# Patient Record
Sex: MALE | Race: WHITE | NOT HISPANIC OR LATINO
[De-identification: names, ages, dates, MRNs, and addresses within clinical notes are randomized per-mention and may not be internally consistent; named-entity substitution may affect disease eponyms.]

---

## 2017-01-26 ENCOUNTER — RESULT REVIEW (OUTPATIENT)
Age: 57
End: 2017-01-26

## 2017-01-27 ENCOUNTER — OUTPATIENT (OUTPATIENT)
Dept: OUTPATIENT SERVICES | Facility: HOSPITAL | Age: 57
LOS: 1 days | Discharge: HOME | End: 2017-01-27
Payer: COMMERCIAL

## 2017-01-27 DIAGNOSIS — K50.10 CROHN'S DISEASE OF LARGE INTESTINE WITHOUT COMPLICATIONS: ICD-10-CM

## 2017-01-27 PROCEDURE — 88305 TISSUE EXAM BY PATHOLOGIST: CPT

## 2017-01-27 PROCEDURE — 88305 TISSUE EXAM BY PATHOLOGIST: CPT | Mod: 26

## 2017-01-27 PROCEDURE — 45380 COLONOSCOPY AND BIOPSY: CPT

## 2017-01-30 LAB — SURGICAL PATHOLOGY FINAL REPORT - CH: SIGNIFICANT CHANGE UP

## 2017-08-18 ENCOUNTER — APPOINTMENT (OUTPATIENT)
Dept: GASTROENTEROLOGY | Facility: CLINIC | Age: 57
End: 2017-08-18
Payer: COMMERCIAL

## 2017-08-18 VITALS
WEIGHT: 246 LBS | DIASTOLIC BLOOD PRESSURE: 91 MMHG | BODY MASS INDEX: 37.28 KG/M2 | HEIGHT: 68 IN | HEART RATE: 89 BPM | SYSTOLIC BLOOD PRESSURE: 142 MMHG | RESPIRATION RATE: 12 BRPM

## 2017-08-18 DIAGNOSIS — Z86.79 PERSONAL HISTORY OF OTHER DISEASES OF THE CIRCULATORY SYSTEM: ICD-10-CM

## 2017-08-18 DIAGNOSIS — Z86.39 PERSONAL HISTORY OF OTHER ENDOCRINE, NUTRITIONAL AND METABOLIC DISEASE: ICD-10-CM

## 2017-08-18 DIAGNOSIS — Z87.19 PERSONAL HISTORY OF OTHER DISEASES OF THE DIGESTIVE SYSTEM: ICD-10-CM

## 2017-08-18 DIAGNOSIS — Z78.9 OTHER SPECIFIED HEALTH STATUS: ICD-10-CM

## 2017-08-18 PROCEDURE — 99214 OFFICE O/P EST MOD 30 MIN: CPT

## 2018-02-15 ENCOUNTER — APPOINTMENT (OUTPATIENT)
Dept: GASTROENTEROLOGY | Facility: CLINIC | Age: 58
End: 2018-02-15
Payer: COMMERCIAL

## 2018-02-15 VITALS
HEIGHT: 68 IN | HEART RATE: 100 BPM | WEIGHT: 243 LBS | SYSTOLIC BLOOD PRESSURE: 131 MMHG | BODY MASS INDEX: 36.83 KG/M2 | DIASTOLIC BLOOD PRESSURE: 90 MMHG | RESPIRATION RATE: 14 BRPM

## 2018-02-15 PROCEDURE — 99214 OFFICE O/P EST MOD 30 MIN: CPT

## 2018-08-10 ENCOUNTER — APPOINTMENT (OUTPATIENT)
Dept: GASTROENTEROLOGY | Facility: CLINIC | Age: 58
End: 2018-08-10
Payer: COMMERCIAL

## 2018-08-10 VITALS
SYSTOLIC BLOOD PRESSURE: 182 MMHG | HEIGHT: 68 IN | HEART RATE: 87 BPM | DIASTOLIC BLOOD PRESSURE: 102 MMHG | RESPIRATION RATE: 16 BRPM | OXYGEN SATURATION: 99 % | WEIGHT: 250 LBS | BODY MASS INDEX: 37.89 KG/M2

## 2018-08-10 PROCEDURE — 99214 OFFICE O/P EST MOD 30 MIN: CPT

## 2018-11-13 ENCOUNTER — APPOINTMENT (OUTPATIENT)
Dept: GASTROENTEROLOGY | Facility: CLINIC | Age: 58
End: 2018-11-13
Payer: COMMERCIAL

## 2018-11-13 VITALS
WEIGHT: 250 LBS | BODY MASS INDEX: 37.89 KG/M2 | HEIGHT: 68 IN | HEART RATE: 70 BPM | SYSTOLIC BLOOD PRESSURE: 150 MMHG | DIASTOLIC BLOOD PRESSURE: 70 MMHG

## 2018-11-13 PROCEDURE — 99214 OFFICE O/P EST MOD 30 MIN: CPT

## 2019-01-17 ENCOUNTER — APPOINTMENT (OUTPATIENT)
Dept: GASTROENTEROLOGY | Facility: GI CENTER | Age: 59
End: 2019-01-17
Payer: COMMERCIAL

## 2019-01-17 ENCOUNTER — OUTPATIENT (OUTPATIENT)
Dept: OUTPATIENT SERVICES | Facility: HOSPITAL | Age: 59
LOS: 1 days | End: 2019-01-17
Payer: COMMERCIAL

## 2019-01-17 ENCOUNTER — RESULT REVIEW (OUTPATIENT)
Age: 59
End: 2019-01-17

## 2019-01-17 DIAGNOSIS — K50.00 CROHN'S DISEASE OF SMALL INTESTINE WITHOUT COMPLICATIONS: ICD-10-CM

## 2019-01-17 PROCEDURE — 88305 TISSUE EXAM BY PATHOLOGIST: CPT | Mod: 26

## 2019-01-17 PROCEDURE — 45380 COLONOSCOPY AND BIOPSY: CPT

## 2019-01-17 PROCEDURE — 88305 TISSUE EXAM BY PATHOLOGIST: CPT

## 2019-01-17 NOTE — PHYSICAL EXAM
[General Appearance - Alert] : alert [General Appearance - In No Acute Distress] : in no acute distress [General Appearance - Well Nourished] : well nourished [General Appearance - Well Developed] : well developed [General Appearance - Well-Appearing] : healthy appearing [Sclera] : the sclera and conjunctiva were normal [PERRL With Normal Accommodation] : pupils were equal in size, round, and reactive to light [Extraocular Movements] : extraocular movements were intact [Outer Ear] : the ears and nose were normal in appearance [Oropharynx] : the oropharynx was normal [Neck Appearance] : the appearance of the neck was normal [Neck Cervical Mass (___cm)] : no neck mass was observed [Jugular Venous Distention Increased] : there was no jugular-venous distention [Thyroid Diffuse Enlargement] : the thyroid was not enlarged [Thyroid Nodule] : there were no palpable thyroid nodules [Auscultation Breath Sounds / Voice Sounds] : lungs were clear to auscultation bilaterally [Heart Rate And Rhythm] : heart rate was normal and rhythm regular [Heart Sounds] : normal S1 and S2 [Heart Sounds Gallop] : no gallops [Murmurs] : no murmurs [Heart Sounds Pericardial Friction Rub] : no pericardial rub [Bowel Sounds] : normal bowel sounds [Abdomen Soft] : soft [Abdomen Tenderness] : non-tender [] : no hepato-splenomegaly [Abdomen Mass (___ Cm)] : no abdominal mass palpated [No CVA Tenderness] : no ~M costovertebral angle tenderness [Abnormal Walk] : normal gait [Musculoskeletal - Swelling] : no joint swelling seen [Skin Color & Pigmentation] : normal skin color and pigmentation [Skin Turgor] : normal skin turgor [Oriented To Time, Place, And Person] : oriented to person, place, and time [FreeTextEntry1] : deferred at this time

## 2019-01-17 NOTE — PROCEDURE
[With Biopsy] : with biopsy [Colitis] : colitis [Procedure Explained] : The procedure was explained [Allergies Reviewed] : allergies reviewed. [Risks] : Risks [Benefits] : benefits [Alternatives] : alternatives [Bleeding] : bleeding risk [Infection] : risk of infection [Consent Obtained] : written consent was obtained prior to the procedure and is detailed in the patient's record [Patient] : the patient [Bowel Prep Kit] : the patient took the appropriate bowel preparation kit as directed [Approved Diet Followed] : the patient avoided solid foods and adhered to the approved diet list for 24 hours prior to the procedure [Automated Blood Pressure Cuff] : automated blood pressure cuff [Cardiac Monitor] : cardiac monitor [Pulse Oximeter] : pulse oximeter [Propofol ___ mg IV] : Propofol [unfilled] ~Umg intravenously [2] : 2 [Sedation Clearance] : the patient was cleared for moderate sedation [Time started: ___] : Start Time:  [unfilled] [Prep Qualtiy: ___] : Prep Quality:  [unfilled] [Withdrawal Time: ___] : Withdrawal Time:  [unfilled] [Time Completed: ___] : Completion Time:  [unfilled] [Performed By: ___] : Performed by:  ANA [Left Lateral Decubitus] : The patient was positioned in the left lateral decubitus position [Moderately Enlarged Prostate] : a moderately enlarged prostate [Cecum (Landmarks/Transillum)] : and guided to the cecum which was identified by the anatomic landmarks of the appendiceal orifice and ileocecal valve and by transillumination in the right lower quadrant [No Difficulty] : without difficulty [Insufflated] : insufflated [Single Pass Needed] : after a single pass [Retroflex View] : a retroflex view of the rectum was performed [Normal] : Normal [Biopsy] : biopsy [Sent to Pathology] : was sent to pathology for analysis [Tolerated Well] : the patient tolerated the procedure well [Vital Signs Stable] : the vital signs were stable [No Complications] : There were no complications [Abnormal Rectum] : a normal rectum [Patient Rotated Into Alternating Positions] : the patient was not rotated [de-identified] : History of Crohn's colitis [de-identified] : Dysplasia surveillance biopsies taken. [de-identified] : See above comments. [de-identified] : See above comments. [de-identified] : See above comments. [de-identified] : See above comments [de-identified] : See above comments. [de-identified] : Normal colonoscopy to the cecum w/o active colitis. Dysplasia surveillance biopsies were taken throughout the colon as outlined above.

## 2019-01-17 NOTE — PROCEDURE
[With Biopsy] : with biopsy [Colitis] : colitis [Procedure Explained] : The procedure was explained [Allergies Reviewed] : allergies reviewed. [Risks] : Risks [Benefits] : benefits [Alternatives] : alternatives [Bleeding] : bleeding risk [Infection] : risk of infection [Consent Obtained] : written consent was obtained prior to the procedure and is detailed in the patient's record [Patient] : the patient [Bowel Prep Kit] : the patient took the appropriate bowel preparation kit as directed [Approved Diet Followed] : the patient avoided solid foods and adhered to the approved diet list for 24 hours prior to the procedure [Automated Blood Pressure Cuff] : automated blood pressure cuff [Cardiac Monitor] : cardiac monitor [Pulse Oximeter] : pulse oximeter [Propofol ___ mg IV] : Propofol [unfilled] ~Umg intravenously [2] : 2 [Sedation Clearance] : the patient was cleared for moderate sedation [Time started: ___] : Start Time:  [unfilled] [Prep Qualtiy: ___] : Prep Quality:  [unfilled] [Withdrawal Time: ___] : Withdrawal Time:  [unfilled] [Time Completed: ___] : Completion Time:  [unfilled] [Performed By: ___] : Performed by:  ANA [Left Lateral Decubitus] : The patient was positioned in the left lateral decubitus position [Moderately Enlarged Prostate] : a moderately enlarged prostate [Cecum (Landmarks/Transillum)] : and guided to the cecum which was identified by the anatomic landmarks of the appendiceal orifice and ileocecal valve and by transillumination in the right lower quadrant [No Difficulty] : without difficulty [Insufflated] : insufflated [Single Pass Needed] : after a single pass [Retroflex View] : a retroflex view of the rectum was performed [Normal] : Normal [Biopsy] : biopsy [Sent to Pathology] : was sent to pathology for analysis [Tolerated Well] : the patient tolerated the procedure well [Vital Signs Stable] : the vital signs were stable [No Complications] : There were no complications [Abnormal Rectum] : a normal rectum [Patient Rotated Into Alternating Positions] : the patient was not rotated [de-identified] : History of Crohn's colitis [de-identified] : Dysplasia surveillance biopsies taken. [de-identified] : See above comments. [de-identified] : See above comments. [de-identified] : See above comments. [de-identified] : See above comments [de-identified] : See above comments. [de-identified] : Normal colonoscopy to the cecum w/o active colitis. Dysplasia surveillance biopsies were taken throughout the colon as outlined above.

## 2019-01-22 LAB — SURGICAL PATHOLOGY STUDY: SIGNIFICANT CHANGE UP

## 2019-01-31 ENCOUNTER — TRANSCRIPTION ENCOUNTER (OUTPATIENT)
Age: 59
End: 2019-01-31

## 2019-06-11 ENCOUNTER — TRANSCRIPTION ENCOUNTER (OUTPATIENT)
Age: 59
End: 2019-06-11

## 2019-07-30 ENCOUNTER — APPOINTMENT (OUTPATIENT)
Dept: GASTROENTEROLOGY | Facility: CLINIC | Age: 59
End: 2019-07-30
Payer: COMMERCIAL

## 2019-07-30 VITALS
DIASTOLIC BLOOD PRESSURE: 80 MMHG | SYSTOLIC BLOOD PRESSURE: 130 MMHG | HEIGHT: 68 IN | WEIGHT: 246 LBS | HEART RATE: 78 BPM | BODY MASS INDEX: 37.28 KG/M2

## 2019-07-30 PROCEDURE — 99214 OFFICE O/P EST MOD 30 MIN: CPT

## 2019-07-30 NOTE — PHYSICAL EXAM
[General Appearance - Alert] : alert [General Appearance - In No Acute Distress] : in no acute distress [General Appearance - Well Nourished] : well nourished [General Appearance - Well Developed] : well developed [General Appearance - Well-Appearing] : healthy appearing [Sclera] : the sclera and conjunctiva were normal [PERRL With Normal Accommodation] : pupils were equal in size, round, and reactive to light [Extraocular Movements] : extraocular movements were intact [Outer Ear] : the ears and nose were normal in appearance [Oropharynx] : the oropharynx was normal [Neck Cervical Mass (___cm)] : no neck mass was observed [Neck Appearance] : the appearance of the neck was normal [Jugular Venous Distention Increased] : there was no jugular-venous distention [Thyroid Diffuse Enlargement] : the thyroid was not enlarged [Thyroid Nodule] : there were no palpable thyroid nodules [Auscultation Breath Sounds / Voice Sounds] : lungs were clear to auscultation bilaterally [Heart Sounds] : normal S1 and S2 [Heart Rate And Rhythm] : heart rate was normal and rhythm regular [Heart Sounds Gallop] : no gallops [Heart Sounds Pericardial Friction Rub] : no pericardial rub [Murmurs] : no murmurs [Bowel Sounds] : normal bowel sounds [Abdomen Soft] : soft [Abdomen Tenderness] : non-tender [] : no hepato-splenomegaly [Abdomen Mass (___ Cm)] : no abdominal mass palpated [No CVA Tenderness] : no ~M costovertebral angle tenderness [Abnormal Walk] : normal gait [Musculoskeletal - Swelling] : no joint swelling seen [Skin Color & Pigmentation] : normal skin color and pigmentation [Skin Turgor] : normal skin turgor [Oriented To Time, Place, And Person] : oriented to person, place, and time [FreeTextEntry1] : deferred at this time

## 2019-07-30 NOTE — HISTORY OF PRESENT ILLNESS
[None] : had no significant interval events [Nausea] : denies nausea [Heartburn] : denies heartburn [Vomiting] : denies vomiting [Diarrhea] : denies diarrhea [Constipation] : denies constipation [Yellow Skin Or Eyes (Jaundice)] : denies jaundice [Abdominal Pain] : denies abdominal pain [Abdominal Swelling] : denies abdominal swelling [Rectal Pain] : denies rectal pain [Inflammatory Bowel Disease] : inflammatory bowel disease [Wt Gain ___ Lbs] : no recent weight gain [Wt Loss ___ Lbs] : no recent weight loss [GERD] : no gastroesophageal reflux disease [Hiatus Hernia] : no hiatus hernia [Peptic Ulcer Disease] : no peptic ulcer disease [Pancreatitis] : no pancreatitis [Kidney Stone] : no kidney stone [Cholelithiasis] : no cholelithiasis [Irritable Bowel Syndrome] : no irritable bowel syndrome [Diverticulitis] : no diverticulitis [Alcohol Abuse] : no alcohol abuse [Malignancy] : no malignancy [Abdominal Surgery] : no abdominal surgery [Appendectomy] : no appendectomy [Cholecystectomy] : no cholecystectomy [de-identified] : January 2019 [de-identified] : colonoscopy with biopsy [de-identified] : Patient presents today for followup evaluation of Crohn's colitis which has been in remission for several years on Pentasa 4 g daily. He is status post a negative colonoscopy with negative dysplasia surveillance biopsies taken throughout the colon in January of this year and has no breakthrough symptoms with regard to abdominal pain or diarrhea or nausea or vomiting or hematochezia.

## 2019-07-30 NOTE — ASSESSMENT
[FreeTextEntry1] : Impression: Uncomplicated Crohn's colitis currently in remission with Pentasa therapy status post a negative colonoscopy with dysplasia surveillance biopsies done in January of this year.\par \par Recommendations: Continue current Pentasa therapy. Repeat colonoscopy in 2021 for continued dysplasia surveillance. He is to return in 6 months for followup evaluation and appeared to understand all of the above instructions and management plan.

## 2019-08-27 ENCOUNTER — RX RENEWAL (OUTPATIENT)
Age: 59
End: 2019-08-27

## 2020-01-10 ENCOUNTER — APPOINTMENT (OUTPATIENT)
Dept: GASTROENTEROLOGY | Facility: CLINIC | Age: 60
End: 2020-01-10
Payer: COMMERCIAL

## 2020-01-10 VITALS
OXYGEN SATURATION: 98 % | SYSTOLIC BLOOD PRESSURE: 140 MMHG | WEIGHT: 245 LBS | RESPIRATION RATE: 16 BRPM | DIASTOLIC BLOOD PRESSURE: 78 MMHG | BODY MASS INDEX: 37.13 KG/M2 | HEIGHT: 68 IN | HEART RATE: 82 BPM

## 2020-01-10 PROCEDURE — 99214 OFFICE O/P EST MOD 30 MIN: CPT

## 2020-01-10 NOTE — PHYSICAL EXAM
[General Appearance - Alert] : alert [General Appearance - In No Acute Distress] : in no acute distress [General Appearance - Well Developed] : well developed [General Appearance - Well Nourished] : well nourished [General Appearance - Well-Appearing] : healthy appearing [Sclera] : the sclera and conjunctiva were normal [PERRL With Normal Accommodation] : pupils were equal in size, round, and reactive to light [Extraocular Movements] : extraocular movements were intact [Neck Appearance] : the appearance of the neck was normal [Oropharynx] : the oropharynx was normal [Outer Ear] : the ears and nose were normal in appearance [Jugular Venous Distention Increased] : there was no jugular-venous distention [Thyroid Diffuse Enlargement] : the thyroid was not enlarged [Neck Cervical Mass (___cm)] : no neck mass was observed [Thyroid Nodule] : there were no palpable thyroid nodules [Heart Rate And Rhythm] : heart rate was normal and rhythm regular [Auscultation Breath Sounds / Voice Sounds] : lungs were clear to auscultation bilaterally [Heart Sounds Gallop] : no gallops [Heart Sounds] : normal S1 and S2 [Murmurs] : no murmurs [Heart Sounds Pericardial Friction Rub] : no pericardial rub [Abdomen Soft] : soft [Bowel Sounds] : normal bowel sounds [] : no hepato-splenomegaly [Abdomen Mass (___ Cm)] : no abdominal mass palpated [Abdomen Tenderness] : non-tender [No CVA Tenderness] : no ~M costovertebral angle tenderness [Abnormal Walk] : normal gait [Musculoskeletal - Swelling] : no joint swelling seen [Skin Color & Pigmentation] : normal skin color and pigmentation [Skin Turgor] : normal skin turgor [Oriented To Time, Place, And Person] : oriented to person, place, and time [FreeTextEntry1] : deferred at this time

## 2020-01-10 NOTE — HISTORY OF PRESENT ILLNESS
[None] : had no significant interval events [Heartburn] : denies heartburn [Nausea] : denies nausea [Vomiting] : denies vomiting [Diarrhea] : denies diarrhea [Constipation] : denies constipation [Yellow Skin Or Eyes (Jaundice)] : denies jaundice [Abdominal Swelling] : denies abdominal swelling [Abdominal Pain] : denies abdominal pain [Rectal Pain] : denies rectal pain [Inflammatory Bowel Disease] : inflammatory bowel disease [Wt Gain ___ Lbs] : no recent weight gain [Wt Loss ___ Lbs] : no recent weight loss [GERD] : no gastroesophageal reflux disease [Hiatus Hernia] : no hiatus hernia [Cholelithiasis] : no cholelithiasis [Peptic Ulcer Disease] : no peptic ulcer disease [Pancreatitis] : no pancreatitis [Irritable Bowel Syndrome] : no irritable bowel syndrome [Kidney Stone] : no kidney stone [Alcohol Abuse] : no alcohol abuse [Diverticulitis] : no diverticulitis [Malignancy] : no malignancy [Appendectomy] : no appendectomy [Abdominal Surgery] : no abdominal surgery [de-identified] : renewing medication [de-identified] : July 2019 [de-identified] : Patient presents today for followup evaluation of Crohn's colitis status post a negative colonoscopy with negative dysplasia surveillance biopsies taken throughout the colon there was done in January 2019. His Crohn's has been in remission for years on Pentasa 4 g daily and he has no breakthrough symptoms or complaints at this time.

## 2020-01-10 NOTE — ASSESSMENT
[FreeTextEntry1] : Impression: Crohn's colitis status post a negative colonoscopy in January 2019 currently in remission with Pentasa 4 g daily.\par \par Recommendations: Continue current Pentasa therapy and a new 90 day prescription with one refill was sent to his pharmacy today. Diet as tolerated. He is to return in 6 months for followup evaluation and appeared to understand all of the above instructions and management plan.

## 2020-07-07 ENCOUNTER — APPOINTMENT (OUTPATIENT)
Dept: GASTROENTEROLOGY | Facility: CLINIC | Age: 60
End: 2020-07-07
Payer: COMMERCIAL

## 2020-07-07 VITALS
BODY MASS INDEX: 36.98 KG/M2 | HEART RATE: 85 BPM | TEMPERATURE: 98 F | HEIGHT: 68 IN | SYSTOLIC BLOOD PRESSURE: 130 MMHG | WEIGHT: 244 LBS | DIASTOLIC BLOOD PRESSURE: 78 MMHG

## 2020-07-07 PROCEDURE — 99214 OFFICE O/P EST MOD 30 MIN: CPT

## 2020-07-07 NOTE — HISTORY OF PRESENT ILLNESS
[Heartburn] : denies heartburn [None] : had no significant interval events [Vomiting] : denies vomiting [Nausea] : denies nausea [Diarrhea] : denies diarrhea [Yellow Skin Or Eyes (Jaundice)] : denies jaundice [Abdominal Swelling] : denies abdominal swelling [Constipation] : denies constipation [Abdominal Pain] : denies abdominal pain [Rectal Pain] : denies rectal pain [Inflammatory Bowel Disease] : inflammatory bowel disease [Wt Gain ___ Lbs] : no recent weight gain [Wt Loss ___ Lbs] : no recent weight loss [GERD] : no gastroesophageal reflux disease [Hiatus Hernia] : no hiatus hernia [Peptic Ulcer Disease] : no peptic ulcer disease [Pancreatitis] : no pancreatitis [Cholelithiasis] : no cholelithiasis [Kidney Stone] : no kidney stone [Irritable Bowel Syndrome] : no irritable bowel syndrome [Diverticulitis] : no diverticulitis [Alcohol Abuse] : no alcohol abuse [Malignancy] : no malignancy [Appendectomy] : no appendectomy [Abdominal Surgery] : no abdominal surgery [Cholecystectomy] : no cholecystectomy [de-identified] : January 2020 [de-identified] : renewing Pentasa [de-identified] : Patient presents for followup evaluation of Crohn's colitis maintained in remission for years with Pentasa 4 g daily. His last colonoscopy for dysplasia surveillance was in January of 2019 which showed no active colitis or pathologic evidence of microscopic colitis or dysplasia. He presently has no lower GI symptoms or complaints.

## 2020-07-07 NOTE — PHYSICAL EXAM
[General Appearance - Alert] : alert [General Appearance - In No Acute Distress] : in no acute distress [General Appearance - Well Nourished] : well nourished [General Appearance - Well-Appearing] : healthy appearing [General Appearance - Well Developed] : well developed [Sclera] : the sclera and conjunctiva were normal [PERRL With Normal Accommodation] : pupils were equal in size, round, and reactive to light [Extraocular Movements] : extraocular movements were intact [Outer Ear] : the ears and nose were normal in appearance [Oropharynx] : the oropharynx was normal [Neck Appearance] : the appearance of the neck was normal [Neck Cervical Mass (___cm)] : no neck mass was observed [Jugular Venous Distention Increased] : there was no jugular-venous distention [Thyroid Diffuse Enlargement] : the thyroid was not enlarged [Thyroid Nodule] : there were no palpable thyroid nodules [Auscultation Breath Sounds / Voice Sounds] : lungs were clear to auscultation bilaterally [Heart Rate And Rhythm] : heart rate was normal and rhythm regular [Heart Sounds] : normal S1 and S2 [Heart Sounds Gallop] : no gallops [Heart Sounds Pericardial Friction Rub] : no pericardial rub [Murmurs] : no murmurs [Bowel Sounds] : normal bowel sounds [Abdomen Soft] : soft [] : no hepato-splenomegaly [Abdomen Tenderness] : non-tender [Abdomen Mass (___ Cm)] : no abdominal mass palpated [Abnormal Walk] : normal gait [No CVA Tenderness] : no ~M costovertebral angle tenderness [Musculoskeletal - Swelling] : no joint swelling seen [Skin Color & Pigmentation] : normal skin color and pigmentation [Skin Turgor] : normal skin turgor [Oriented To Time, Place, And Person] : oriented to person, place, and time [FreeTextEntry1] : deferred at this time

## 2020-07-07 NOTE — ASSESSMENT
[FreeTextEntry1] : Impression: Crohn's colitis currently in remission with Pentasa 4 g daily.\par \par Recommendations: Patient is to continue current antacid therapy and a new 90 day prescription with refills was sent to his pharmacy today. Diet as tolerated. Repeat colonoscopy in January 2021 or every 2 years for continued dysplasia surveillance. He is to return in 6 months for followup evaluation or sooner if necessary and appeared to understand all of the above instructions, information, and management plan.\par \par

## 2021-01-19 ENCOUNTER — APPOINTMENT (OUTPATIENT)
Dept: GASTROENTEROLOGY | Facility: CLINIC | Age: 61
End: 2021-01-19
Payer: COMMERCIAL

## 2021-01-19 VITALS
SYSTOLIC BLOOD PRESSURE: 148 MMHG | WEIGHT: 250 LBS | TEMPERATURE: 97.5 F | HEART RATE: 87 BPM | DIASTOLIC BLOOD PRESSURE: 86 MMHG | BODY MASS INDEX: 37.89 KG/M2 | HEIGHT: 68 IN

## 2021-01-19 PROCEDURE — 99072 ADDL SUPL MATRL&STAF TM PHE: CPT

## 2021-01-19 PROCEDURE — 99214 OFFICE O/P EST MOD 30 MIN: CPT

## 2021-01-19 RX ORDER — DICLOFENAC SODIUM 1% 10 MG/G
1 GEL TOPICAL
Qty: 500 | Refills: 0 | Status: ACTIVE | COMMUNITY
Start: 2020-09-29

## 2021-01-19 NOTE — HISTORY OF PRESENT ILLNESS
[None] : had no significant interval events [Heartburn] : denies heartburn [Nausea] : denies nausea [Vomiting] : denies vomiting [Diarrhea] : denies diarrhea [Constipation] : denies constipation [Yellow Skin Or Eyes (Jaundice)] : denies jaundice [Abdominal Swelling] : denies abdominal swelling [Abdominal Pain] : denies abdominal pain [Rectal Pain] : denies rectal pain [Inflammatory Bowel Disease] : inflammatory bowel disease [Wt Gain ___ Lbs] : no recent weight gain [Wt Loss ___ Lbs] : no recent weight loss [GERD] : no gastroesophageal reflux disease [Hiatus Hernia] : no hiatus hernia [Peptic Ulcer Disease] : no peptic ulcer disease [Pancreatitis] : no pancreatitis [Cholelithiasis] : no cholelithiasis [Kidney Stone] : no kidney stone [Irritable Bowel Syndrome] : no irritable bowel syndrome [Diverticulitis] : no diverticulitis [Alcohol Abuse] : no alcohol abuse [Malignancy] : no malignancy [Appendectomy] : no appendectomy [Abdominal Surgery] : no abdominal surgery [Cholecystectomy] : no cholecystectomy [de-identified] : July 7, 2020 [de-identified] : Patient presents for followup evaluation of Crohn's colitis currently in remission with Pentasa 4 g daily. His last colonoscopy for dysplasia surveillance was in early 2019 and he presently has no lower GI symptoms or complaints or breakthrough symptoms and has been in remission for several years with the above Pentasa therapy.

## 2021-01-19 NOTE — ASSESSMENT
[FreeTextEntry1] : Impression: Crohn's colitis currently in remission with the above Pentasa therapy rule out dysplasia. Patient's last colonoscopy with dysplasia surveillance was in January 2019.\par \par Recommendations: Continue current Pentasa therapy. Repeat high risk screening colonoscopy was advised to continue dysplasia surveillance. The risks versus benefits of repeat colonoscopy and intravenous sedation, and alternative testing such as virtual colonoscopy, were individually explained to the patient today who appeared to understand all of the above and was agreeable to proceed with repeat colonoscopy. His ASA classification is 2. He will be prepped with MiraLax and Dulcolax tablets and is medically optimized for the proposed colonoscopy in appeared to understand all of the above instructions and management plan.\par

## 2021-02-28 DIAGNOSIS — Z01.818 ENCOUNTER FOR OTHER PREPROCEDURAL EXAMINATION: ICD-10-CM

## 2021-03-01 ENCOUNTER — APPOINTMENT (OUTPATIENT)
Dept: DISASTER EMERGENCY | Facility: CLINIC | Age: 61
End: 2021-03-01

## 2021-03-02 LAB — SARS-COV-2 N GENE NPH QL NAA+PROBE: NOT DETECTED

## 2021-03-05 ENCOUNTER — RESULT REVIEW (OUTPATIENT)
Age: 61
End: 2021-03-05

## 2021-03-05 ENCOUNTER — APPOINTMENT (OUTPATIENT)
Dept: GASTROENTEROLOGY | Facility: GI CENTER | Age: 61
End: 2021-03-05
Payer: COMMERCIAL

## 2021-03-05 ENCOUNTER — OUTPATIENT (OUTPATIENT)
Dept: OUTPATIENT SERVICES | Facility: HOSPITAL | Age: 61
LOS: 1 days | End: 2021-03-05
Payer: COMMERCIAL

## 2021-03-05 DIAGNOSIS — K50.10 CROHN'S DISEASE OF LARGE INTESTINE WITHOUT COMPLICATIONS: ICD-10-CM

## 2021-03-05 PROCEDURE — 45380 COLONOSCOPY AND BIOPSY: CPT

## 2021-03-05 PROCEDURE — 88305 TISSUE EXAM BY PATHOLOGIST: CPT | Mod: 26

## 2021-03-05 PROCEDURE — 88305 TISSUE EXAM BY PATHOLOGIST: CPT

## 2021-03-05 NOTE — PROCEDURE
[With Biopsy] : with biopsy [Colitis] : colitis [Procedure Explained] : The procedure was explained [Allergies Reviewed] : allergies reviewed. [Risks] : Risks [Benefits] : benefits [Alternatives] : alternatives [Bleeding] : bleeding risk [Infection] : risk of infection [Consent Obtained] : written consent was obtained prior to the procedure and is detailed in the patient's record [Patient] : the patient [Bowel Prep Kit] : the patient took the appropriate bowel preparation kit as directed [Approved Diet Followed] : the patient avoided solid foods and adhered to the approved diet list for 24 hours prior to the procedure [Automated Blood Pressure Cuff] : automated blood pressure cuff [Cardiac Monitor] : cardiac monitor [Pulse Oximeter] : pulse oximeter [___ L/min Oxygen via NC] : [unfilled] ~Uliters/minute oxygen via nasal cannula [2] : 2 [Sedation Clearance] : the patient was cleared for moderate sedation [Performed By: ___] : Performed by:  ANA [Propofol ___ mg IV] : Propofol [unfilled] ~Umg intravenously [Time started: ___] : Start Time:  [unfilled] [Prep Qualtiy: ___] : Prep Quality:  [unfilled] [Withdrawal Time: ___] : Withdrawal Time:  [unfilled] [Time Completed: ___] : Completion Time:  [unfilled] [Left Lateral Decubitus] : The patient was positioned in the left lateral decubitus position [Moderately Enlarged Prostate] : a moderately enlarged prostate [Cecum (Landmarks/Transillum)] : and guided to the cecum which was identified by the anatomic landmarks of the appendiceal orifice and ileocecal valve and by transillumination in the right lower quadrant [Terminal Ileum via Ileocecal Valve] : and the terminal ileum was examined by entering the ileocecal valve [Insufflated] : insufflated [Single Pass Needed] : after a single pass [Retroflex View] : a retroflex view of the rectum was performed [Normal] : Normal [Colitis Patchy] : colitis patchy [Hemorrhoids] : hemorrhoids [Biopsy] : biopsy [Sent to Pathology] : was sent to pathology for analysis [Tolerated Well] : the patient tolerated the procedure well [Vital Signs Stable] : the vital signs were stable [No Complications] : There were no complications [Abnormal Rectum] : a normal rectum [External Hemorrhoids] : no external hemorrhoids [Patient Rotated Into Alternating Positions] : the patient was not rotated [de-identified] : Crohn's colitis dysplasia surveillance [de-identified] : [id number] [de-identified] : Terminal ileum entered and found to be normal w/o ileitis. [de-identified] : Dysplasia surveillance biopsies taken. [de-identified] : See above. [de-identified] : See above. [de-identified] : See above. [de-identified] : Moderate sigmoiditis seen and biopsied for dysplasia. [de-identified] : Internal hemorrhoids noted on retroflexion. Dysplasia surveillance biopsies taken. No proctitis noted. [de-identified] : Patchy sigmoiditis with internal hemorrhoids otherwise normal colonoscopy to the cecum and into the terminal ileum Dysplasia surveillance biopsies taken throughout the colon.

## 2021-03-05 NOTE — PROCEDURE
[With Biopsy] : with biopsy [Colitis] : colitis [Procedure Explained] : The procedure was explained [Allergies Reviewed] : allergies reviewed. [Risks] : Risks [Benefits] : benefits [Alternatives] : alternatives [Bleeding] : bleeding risk [Infection] : risk of infection [Consent Obtained] : written consent was obtained prior to the procedure and is detailed in the patient's record [Patient] : the patient [Bowel Prep Kit] : the patient took the appropriate bowel preparation kit as directed [Approved Diet Followed] : the patient avoided solid foods and adhered to the approved diet list for 24 hours prior to the procedure [Automated Blood Pressure Cuff] : automated blood pressure cuff [Cardiac Monitor] : cardiac monitor [Pulse Oximeter] : pulse oximeter [___ L/min Oxygen via NC] : [unfilled] ~Uliters/minute oxygen via nasal cannula [2] : 2 [Sedation Clearance] : the patient was cleared for moderate sedation [Performed By: ___] : Performed by:  ANA [Propofol ___ mg IV] : Propofol [unfilled] ~Umg intravenously [Time started: ___] : Start Time:  [unfilled] [Prep Qualtiy: ___] : Prep Quality:  [unfilled] [Withdrawal Time: ___] : Withdrawal Time:  [unfilled] [Time Completed: ___] : Completion Time:  [unfilled] [Left Lateral Decubitus] : The patient was positioned in the left lateral decubitus position [Moderately Enlarged Prostate] : a moderately enlarged prostate [Cecum (Landmarks/Transillum)] : and guided to the cecum which was identified by the anatomic landmarks of the appendiceal orifice and ileocecal valve and by transillumination in the right lower quadrant [Terminal Ileum via Ileocecal Valve] : and the terminal ileum was examined by entering the ileocecal valve [Insufflated] : insufflated [Single Pass Needed] : after a single pass [Retroflex View] : a retroflex view of the rectum was performed [Normal] : Normal [Colitis Patchy] : colitis patchy [Hemorrhoids] : hemorrhoids [Biopsy] : biopsy [Sent to Pathology] : was sent to pathology for analysis [Tolerated Well] : the patient tolerated the procedure well [Vital Signs Stable] : the vital signs were stable [No Complications] : There were no complications [Abnormal Rectum] : a normal rectum [External Hemorrhoids] : no external hemorrhoids [Patient Rotated Into Alternating Positions] : the patient was not rotated [de-identified] : Crohn's colitis dysplasia surveillance [de-identified] : [id number] [de-identified] : Terminal ileum entered and found to be normal w/o ileitis. [de-identified] : Dysplasia surveillance biopsies taken. [de-identified] : See above. [de-identified] : See above. [de-identified] : See above. [de-identified] : Moderate sigmoiditis seen and biopsied for dysplasia. [de-identified] : Internal hemorrhoids noted on retroflexion. Dysplasia surveillance biopsies taken. No proctitis noted. [de-identified] : Patchy sigmoiditis with internal hemorrhoids otherwise normal colonoscopy to the cecum and into the terminal ileum Dysplasia surveillance biopsies taken throughout the colon.

## 2021-03-05 NOTE — ASSESSMENT
[FreeTextEntry1] : Same Pentasa Rx for now. Await biopsy results. Repeat dysplasia surveillance colonoscopy in two years.

## 2021-03-05 NOTE — REASON FOR VISIT
[Procedure: _________] : a [unfilled] procedure visit [FreeTextEntry1] : Pt. is here for colonoscopy for Crohn's colitis dysplasia surveillance. [Colonoscopy] : a colonoscopy [FreeTextEntry2] : Pt. is here for colonoscopy for Crohn's colitis dysplasia surveillance.

## 2021-03-09 LAB — SURGICAL PATHOLOGY STUDY: SIGNIFICANT CHANGE UP

## 2021-03-15 ENCOUNTER — TRANSCRIPTION ENCOUNTER (OUTPATIENT)
Age: 61
End: 2021-03-15

## 2021-10-06 ENCOUNTER — NON-APPOINTMENT (OUTPATIENT)
Age: 61
End: 2021-10-06

## 2021-10-11 ENCOUNTER — APPOINTMENT (OUTPATIENT)
Dept: GASTROENTEROLOGY | Facility: CLINIC | Age: 61
End: 2021-10-11
Payer: COMMERCIAL

## 2021-10-11 VITALS
BODY MASS INDEX: 36.68 KG/M2 | TEMPERATURE: 97.5 F | HEIGHT: 68 IN | RESPIRATION RATE: 14 BRPM | OXYGEN SATURATION: 98 % | HEART RATE: 96 BPM | SYSTOLIC BLOOD PRESSURE: 130 MMHG | DIASTOLIC BLOOD PRESSURE: 78 MMHG | WEIGHT: 242 LBS

## 2021-10-11 DIAGNOSIS — Z78.9 OTHER SPECIFIED HEALTH STATUS: ICD-10-CM

## 2021-10-11 DIAGNOSIS — Z09 ENCOUNTER FOR FOLLOW-UP EXAMINATION AFTER COMPLETED TREATMENT FOR CONDITIONS OTHER THAN MALIGNANT NEOPLASM: ICD-10-CM

## 2021-10-11 PROCEDURE — 99214 OFFICE O/P EST MOD 30 MIN: CPT

## 2021-10-11 RX ORDER — MELOXICAM 15 MG/1
15 TABLET ORAL
Qty: 90 | Refills: 0 | Status: DISCONTINUED | COMMUNITY
Start: 2020-12-04 | End: 2021-10-11

## 2021-10-11 RX ORDER — NAPROXEN 500 MG/1
500 TABLET ORAL
Qty: 50 | Refills: 0 | Status: DISCONTINUED | COMMUNITY
Start: 2020-09-30 | End: 2021-10-11

## 2021-10-11 NOTE — PHYSICAL EXAM
[General Appearance - Alert] : alert [General Appearance - In No Acute Distress] : in no acute distress [General Appearance - Well Nourished] : well nourished [General Appearance - Well Developed] : well developed [General Appearance - Well-Appearing] : healthy appearing [Sclera] : the sclera and conjunctiva were normal [PERRL With Normal Accommodation] : pupils were equal in size, round, and reactive to light [Outer Ear] : the ears and nose were normal in appearance [Extraocular Movements] : extraocular movements were intact [Oropharynx] : the oropharynx was normal [Neck Appearance] : the appearance of the neck was normal [Neck Cervical Mass (___cm)] : no neck mass was observed [Jugular Venous Distention Increased] : there was no jugular-venous distention [Thyroid Diffuse Enlargement] : the thyroid was not enlarged [Thyroid Nodule] : there were no palpable thyroid nodules [Auscultation Breath Sounds / Voice Sounds] : lungs were clear to auscultation bilaterally [Heart Rate And Rhythm] : heart rate was normal and rhythm regular [Heart Sounds] : normal S1 and S2 [Heart Sounds Gallop] : no gallops [Murmurs] : no murmurs [Heart Sounds Pericardial Friction Rub] : no pericardial rub [Bowel Sounds] : normal bowel sounds [Abdomen Tenderness] : non-tender [Abdomen Soft] : soft [] : no hepato-splenomegaly [Abdomen Mass (___ Cm)] : no abdominal mass palpated [No CVA Tenderness] : no ~M costovertebral angle tenderness [Abnormal Walk] : normal gait [Musculoskeletal - Swelling] : no joint swelling seen [Skin Color & Pigmentation] : normal skin color and pigmentation [Skin Turgor] : normal skin turgor [Oriented To Time, Place, And Person] : oriented to person, place, and time [FreeTextEntry1] : deferred at this time

## 2021-10-11 NOTE — ASSESSMENT
[FreeTextEntry1] : Impression: Crohn's colitis with recent colonoscopy as outlined above doing well with current Pentasa therapy.\par \par Recommendations: Patient is to continue current Pentasa therapy with diet as tolerated and return here in 6 months time for follow-up evaluation.  He appeared to understand all of the above instructions, information, and management plan.

## 2021-10-11 NOTE — HISTORY OF PRESENT ILLNESS
[None] : had no significant interval events [Heartburn] : denies heartburn [Nausea] : denies nausea [Vomiting] : denies vomiting [Diarrhea] : denies diarrhea [Constipation] : denies constipation [Yellow Skin Or Eyes (Jaundice)] : denies jaundice [Abdominal Pain] : denies abdominal pain [Abdominal Swelling] : denies abdominal swelling [Rectal Pain] : denies rectal pain [Inflammatory Bowel Disease] : inflammatory bowel disease [Wt Gain ___ Lbs] : no recent weight gain [Wt Loss ___ Lbs] : no recent weight loss [GERD] : no gastroesophageal reflux disease [Hiatus Hernia] : no hiatus hernia [Peptic Ulcer Disease] : no peptic ulcer disease [Pancreatitis] : no pancreatitis [Cholelithiasis] : no cholelithiasis [Kidney Stone] : no kidney stone [Irritable Bowel Syndrome] : no irritable bowel syndrome [Diverticulitis] : no diverticulitis [Alcohol Abuse] : no alcohol abuse [Malignancy] : no malignancy [Abdominal Surgery] : no abdominal surgery [Appendectomy] : no appendectomy [Cholecystectomy] : no cholecystectomy [de-identified] : March 5, 2021 [de-identified] : Colonoscopy with biopsy [de-identified] : Patient presents for follow-up evaluation of Crohn's colitis status post colonoscopy with polypectomy March 5 of this year which showed localized sigmoiditis without inflammation elsewhere in the colon with biopsies that were negative for dysplasia or microscopic inflammation other than the sigmoid colon.  He had been taking nonsteroidal anti-inflammatory agent for his knee which he was told to stop and since then has had no issues with diarrhea or abdominal cramping.  The above sigmoiditis was likely secondary to NSAID use at that time.  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  He takes Pentasa 4 capsules daily for his Crohn's colitis.

## 2022-05-17 ENCOUNTER — APPOINTMENT (OUTPATIENT)
Dept: GASTROENTEROLOGY | Facility: CLINIC | Age: 62
End: 2022-05-17
Payer: COMMERCIAL

## 2022-05-17 VITALS
HEART RATE: 100 BPM | HEIGHT: 68 IN | RESPIRATION RATE: 14 BRPM | BODY MASS INDEX: 37.89 KG/M2 | SYSTOLIC BLOOD PRESSURE: 180 MMHG | WEIGHT: 250 LBS | OXYGEN SATURATION: 98 % | DIASTOLIC BLOOD PRESSURE: 96 MMHG

## 2022-05-17 DIAGNOSIS — K52.9 NONINFECTIVE GASTROENTERITIS AND COLITIS, UNSPECIFIED: ICD-10-CM

## 2022-05-17 PROCEDURE — 99214 OFFICE O/P EST MOD 30 MIN: CPT

## 2022-05-17 NOTE — ASSESSMENT
[FreeTextEntry1] : Impression: Crohn's colitis with sigmoiditis seen on most recent colonoscopy symptomatically improved with Pentasa 4 g daily.\par \par Recommendations: Patient is to continue current Pentasa therapy and a new 90-day prescription with refills was sent to his pharmacy today.  Work-up of his hypercalcemia as per his primary care physician as well as nephrologist and possible endocrinologist.  He is to return to see me in 6 months time for follow-up evaluation and appeared to understand all of the above instructions, information, and management plan.

## 2022-05-17 NOTE — HISTORY OF PRESENT ILLNESS
[None] : had no significant interval events [Heartburn] : denies heartburn [Nausea] : denies nausea [Vomiting] : denies vomiting [Diarrhea] : denies diarrhea [Constipation] : denies constipation [Yellow Skin Or Eyes (Jaundice)] : denies jaundice [Abdominal Pain] : denies abdominal pain [Abdominal Swelling] : denies abdominal swelling [Rectal Pain] : denies rectal pain [Inflammatory Bowel Disease] : inflammatory bowel disease [Wt Gain ___ Lbs] : no recent weight gain [Wt Loss ___ Lbs] : no recent weight loss [GERD] : no gastroesophageal reflux disease [Hiatus Hernia] : no hiatus hernia [Peptic Ulcer Disease] : no peptic ulcer disease [Pancreatitis] : no pancreatitis [Cholelithiasis] : no cholelithiasis [Kidney Stone] : no kidney stone [Irritable Bowel Syndrome] : no irritable bowel syndrome [Diverticulitis] : no diverticulitis [Alcohol Abuse] : no alcohol abuse [Malignancy] : no malignancy [Abdominal Surgery] : no abdominal surgery [Appendectomy] : no appendectomy [Cholecystectomy] : no cholecystectomy [de-identified] : October 11, 2021 [de-identified] : Patient presents for follow-up evaluation of Crohn's colitis status post most recent colonoscopy March 5, 2021 which showed mild sigmoiditis but was otherwise unremarkable with negative colonic biopsies taken for dysplasia.  He has no lower GI symptoms or complaints at this time and his Crohn's disease is presently managed with Pentasa 4 g daily.  He was recently diagnosed with idiopathic hypercalcemia and is undergoing work-up for this including parathyroid scanning.

## 2022-07-29 ENCOUNTER — TRANSCRIPTION ENCOUNTER (OUTPATIENT)
Age: 62
End: 2022-07-29

## 2022-09-02 ENCOUNTER — NON-APPOINTMENT (OUTPATIENT)
Age: 62
End: 2022-09-02

## 2022-11-07 ENCOUNTER — APPOINTMENT (OUTPATIENT)
Dept: GASTROENTEROLOGY | Facility: CLINIC | Age: 62
End: 2022-11-07

## 2022-11-07 VITALS
BODY MASS INDEX: 36.98 KG/M2 | HEIGHT: 68 IN | WEIGHT: 244 LBS | DIASTOLIC BLOOD PRESSURE: 84 MMHG | TEMPERATURE: 98.2 F | RESPIRATION RATE: 14 BRPM | HEART RATE: 95 BPM | SYSTOLIC BLOOD PRESSURE: 170 MMHG | OXYGEN SATURATION: 98 %

## 2022-11-07 DIAGNOSIS — Z86.39 PERSONAL HISTORY OF OTHER ENDOCRINE, NUTRITIONAL AND METABOLIC DISEASE: ICD-10-CM

## 2022-11-07 PROCEDURE — 99214 OFFICE O/P EST MOD 30 MIN: CPT

## 2022-11-07 RX ORDER — ATORVASTATIN CALCIUM 20 MG/1
20 TABLET, FILM COATED ORAL
Qty: 90 | Refills: 0 | Status: ACTIVE | COMMUNITY
Start: 2022-07-07

## 2022-11-07 RX ORDER — VALSARTAN 320 MG/1
320 TABLET, COATED ORAL
Qty: 90 | Refills: 0 | Status: ACTIVE | COMMUNITY
Start: 2022-10-18

## 2022-11-07 NOTE — HISTORY OF PRESENT ILLNESS
[FreeTextEntry1] : 3/5/2021.  Internal hemorrhoids.  Negative for dysplasia biopsies.  1/17/2019 normal colonoscopy negative biopsies.  1/22/2017 normal colonoscopy.  Negative biopsies.  4/9/2009.  Sigmoid colitis negative biopsies.

## 2022-11-07 NOTE — PHYSICAL EXAM
[Alert] : alert [Normal Voice/Communication] : normal voice/communication [Healthy Appearing] : healthy appearing [No Acute Distress] : no acute distress [Well Developed] : well developed [Well Nourished] : well nourished [Sclera] : the sclera and conjunctiva were normal [Hearing Threshold Finger Rub Not [place]] : hearing was normal [Normal Lips/Gums] : the lips and gums were normal [Oropharynx] : the oropharynx was normal [Normal Appearance] : the appearance of the neck was normal [No Neck Mass] : no neck mass was observed [No Respiratory Distress] : no respiratory distress [No Acc Muscle Use] : no accessory muscle use [Respiration, Rhythm And Depth] : normal respiratory rhythm and effort [Auscultation Breath Sounds / Voice Sounds] : lungs were clear to auscultation bilaterally [Heart Rate And Rhythm] : heart rate was normal and rhythm regular [Normal S1, S2] : normal S1 and S2 [Murmurs] : no murmurs [None] : no edema [Bowel Sounds] : normal bowel sounds [Abdomen Tenderness] : non-tender [No Masses] : no abdominal mass palpated [Abdomen Soft] : soft [] : no hepatosplenomegaly [No CVA Tenderness] : no CVA  tenderness [Abnormal Walk] : normal gait [No Joint Swelling] : no joint swelling seen [Normal Color / Pigmentation] : normal skin color and pigmentation [Oriented To Time, Place, And Person] : oriented to person, place, and time [de-identified] : Patient is overweight [de-identified] : Deferred until colonoscopy

## 2022-11-07 NOTE — ASSESSMENT
[FreeTextEntry1] : Impression: Crohn's colitis currently in remission with current mesalamine therapy.  Patient's last dysplasia surveillance colonoscopy was in 2021.\par \par Recommendations: Repeat colonoscopy was advised for continued dysplasia surveillance to be done in January 2023.  The risk versus benefits of repeat colonoscopy and intravenous sedation, and alternative testing such as virtual colonoscopy, were individually explained to the patient today who appeared to understand all of the above and was agreeable to proceeding with repeat colonoscopy.  His ASA classification is 2.  He will be prepped with generic Suprep and is medically optimized for the proposed colonoscopy and seemed to understand all of the above instructions, information, and management plan.

## 2023-01-04 ENCOUNTER — TRANSCRIPTION ENCOUNTER (OUTPATIENT)
Age: 63
End: 2023-01-04

## 2023-01-05 ENCOUNTER — OUTPATIENT (OUTPATIENT)
Dept: OUTPATIENT SERVICES | Facility: HOSPITAL | Age: 63
LOS: 1 days | End: 2023-01-05
Payer: COMMERCIAL

## 2023-01-05 ENCOUNTER — APPOINTMENT (OUTPATIENT)
Dept: GASTROENTEROLOGY | Facility: GI CENTER | Age: 63
End: 2023-01-05
Payer: COMMERCIAL

## 2023-01-05 ENCOUNTER — RESULT REVIEW (OUTPATIENT)
Age: 63
End: 2023-01-05

## 2023-01-05 DIAGNOSIS — K50.10 CROHN'S DISEASE OF LARGE INTESTINE WITHOUT COMPLICATIONS: ICD-10-CM

## 2023-01-05 DIAGNOSIS — K64.8 OTHER HEMORRHOIDS: ICD-10-CM

## 2023-01-05 PROCEDURE — 88305 TISSUE EXAM BY PATHOLOGIST: CPT

## 2023-01-05 PROCEDURE — 88305 TISSUE EXAM BY PATHOLOGIST: CPT | Mod: 26

## 2023-01-05 PROCEDURE — 45380 COLONOSCOPY AND BIOPSY: CPT

## 2023-01-05 NOTE — ASSESSMENT
[FreeTextEntry1] : Impression: Crohn's colitis currently in remission with current mesalamine therapy.  Patient's last dysplasia surveillance colonoscopy was in 2021.\par \par Recommendations: Repeat colonoscopy was advised for continued dysplasia surveillance to be done in January 2023.  The risk versus benefits of repeat colonoscopy and intravenous sedation, and alternative testing such as virtual colonoscopy, were individually explained to the patient today who appeared to understand all of the above and was agreeable to proceeding with repeat colonoscopy.  His ASA classification is 2.  He will be prepped with generic Suprep and is medically optimized for the proposed colonoscopy and seemed to understand all of the above instructions, information, and management plan. COVID PCR is negative. he is medically optimized for the proposed colonoscopy.

## 2023-01-05 NOTE — PHYSICAL EXAM
[Alert] : alert [Normal Voice/Communication] : normal voice/communication [Healthy Appearing] : healthy appearing [No Acute Distress] : no acute distress [Well Developed] : well developed [Well Nourished] : well nourished [Sclera] : the sclera and conjunctiva were normal [Hearing Threshold Finger Rub Not [place]] : hearing was normal [Normal Lips/Gums] : the lips and gums were normal [Oropharynx] : the oropharynx was normal [Normal Appearance] : the appearance of the neck was normal [No Neck Mass] : no neck mass was observed [No Respiratory Distress] : no respiratory distress [No Acc Muscle Use] : no accessory muscle use [Respiration, Rhythm And Depth] : normal respiratory rhythm and effort [Auscultation Breath Sounds / Voice Sounds] : lungs were clear to auscultation bilaterally [Heart Rate And Rhythm] : heart rate was normal and rhythm regular [Normal S1, S2] : normal S1 and S2 [Murmurs] : no murmurs [None] : no edema [Bowel Sounds] : normal bowel sounds [Abdomen Tenderness] : non-tender [No Masses] : no abdominal mass palpated [Abdomen Soft] : soft [] : no hepatosplenomegaly [No CVA Tenderness] : no CVA  tenderness [Abnormal Walk] : normal gait [No Joint Swelling] : no joint swelling seen [Normal Color / Pigmentation] : normal skin color and pigmentation [Oriented To Time, Place, And Person] : oriented to person, place, and time [de-identified] : Patient is overweight [de-identified] : Deferred until colonoscopy

## 2023-01-05 NOTE — REASON FOR VISIT
[Procedure: _________] : a [unfilled] procedure visit [FreeTextEntry1] : For Crohn's colitis dysplasia surveillance.

## 2023-01-09 LAB — SURGICAL PATHOLOGY STUDY: SIGNIFICANT CHANGE UP

## 2023-01-11 ENCOUNTER — NON-APPOINTMENT (OUTPATIENT)
Age: 63
End: 2023-01-11

## 2023-01-17 ENCOUNTER — APPOINTMENT (OUTPATIENT)
Dept: UROLOGY | Facility: CLINIC | Age: 63
End: 2023-01-17
Payer: COMMERCIAL

## 2023-01-17 VITALS
HEIGHT: 68 IN | BODY MASS INDEX: 36.37 KG/M2 | SYSTOLIC BLOOD PRESSURE: 150 MMHG | WEIGHT: 240 LBS | HEART RATE: 111 BPM | DIASTOLIC BLOOD PRESSURE: 92 MMHG

## 2023-01-17 DIAGNOSIS — Z78.9 OTHER SPECIFIED HEALTH STATUS: ICD-10-CM

## 2023-01-17 PROCEDURE — 99204 OFFICE O/P NEW MOD 45 MIN: CPT

## 2023-01-17 RX ORDER — ERGOCALCIFEROL 1.25 MG/1
1.25 MG CAPSULE, LIQUID FILLED ORAL
Qty: 13 | Refills: 0 | Status: DISCONTINUED | COMMUNITY
Start: 2022-11-01 | End: 2023-01-17

## 2023-01-17 RX ORDER — OXYCODONE AND ACETAMINOPHEN 5; 325 MG/1; MG/1
5-325 TABLET ORAL
Qty: 20 | Refills: 0 | Status: DISCONTINUED | COMMUNITY
Start: 2022-06-17 | End: 2023-01-17

## 2023-01-17 RX ORDER — LOPERAMIDE HYDROCHLORIDE 2 MG/1
2 CAPSULE ORAL
Qty: 30 | Refills: 0 | Status: DISCONTINUED | COMMUNITY
Start: 2021-08-24 | End: 2023-01-17

## 2023-01-17 RX ORDER — SODIUM SULFATE, POTASSIUM SULFATE AND MAGNESIUM SULFATE 1.6; 3.13; 17.5 G/177ML; G/177ML; G/177ML
17.5-3.13-1.6 SOLUTION ORAL
Qty: 2 | Refills: 0 | Status: DISCONTINUED | COMMUNITY
Start: 2022-11-07 | End: 2023-01-17

## 2023-01-17 RX ORDER — VALSARTAN AND HYDROCHLOROTHIAZIDE 320; 12.5 MG/1; MG/1
320-12.5 TABLET, FILM COATED ORAL
Qty: 90 | Refills: 0 | Status: DISCONTINUED | COMMUNITY
Start: 2020-11-17 | End: 2023-01-17

## 2023-01-17 RX ORDER — MESALAMINE 500 MG/1
500 CAPSULE ORAL
Qty: 720 | Refills: 1 | Status: DISCONTINUED | COMMUNITY
Start: 2017-01-19 | End: 2023-01-17

## 2023-01-17 RX ORDER — SIMVASTATIN 20 MG/1
20 TABLET, FILM COATED ORAL
Qty: 90 | Refills: 0 | Status: DISCONTINUED | COMMUNITY
Start: 2016-11-07 | End: 2023-01-17

## 2023-01-17 RX ORDER — MV-MIN/FOLIC/VIT K/LYCOP/COQ10 200-100MCG
CAPSULE ORAL
Refills: 0 | Status: DISCONTINUED | COMMUNITY
End: 2023-01-17

## 2023-01-17 RX ORDER — VALSARTAN AND HYDROCHLOROTHIAZIDE 160; 12.5 MG/1; MG/1
160-12.5 TABLET, FILM COATED ORAL
Qty: 90 | Refills: 0 | Status: DISCONTINUED | COMMUNITY
Start: 2016-11-07 | End: 2023-01-17

## 2023-01-17 NOTE — ASSESSMENT
[FreeTextEntry1] : Impression:\par \par elevated PSA\par \par Plan:\par \par free and total PSA\par Prostate MRI to help in biopsy planning\par RDP for prostate

## 2023-01-17 NOTE — PHYSICAL EXAM
[General Appearance - Well Developed] : well developed [General Appearance - Well Nourished] : well nourished [Normal Appearance] : normal appearance [Well Groomed] : well groomed [General Appearance - In No Acute Distress] : no acute distress [Edema] : no peripheral edema [Respiration, Rhythm And Depth] : normal respiratory rhythm and effort [Exaggerated Use Of Accessory Muscles For Inspiration] : no accessory muscle use [Abdomen Soft] : soft [Abdomen Tenderness] : non-tender [Costovertebral Angle Tenderness] : no ~M costovertebral angle tenderness [Urethral Meatus] : meatus normal [Penis Abnormality] : normal uncircumcised penis [Urinary Bladder Findings] : the bladder was normal on palpation [Scrotum] : the scrotum was normal [Rectal Exam - Seminal Vesicles] : the seminal vesicles were normal [Epididymis] : the epididymides were normal [Testes Tenderness] : no tenderness of the testes [Testes Mass (___cm)] : there were no testicular masses [Anus Abnormality] : the anus and perineum were normal [Rectal Exam - Rectum] : digital rectal exam was normal [Prostate Enlargement] : the prostate was not enlarged [Prostate Tenderness] : the prostate was not tender [No Prostate Nodules] : no prostate nodules [Prostate Size ___ (0-4)] : prostate size [unfilled] (scale: 0-4) [Normal Station and Gait] : the gait and station were normal for the patient's age [] : no rash [No Focal Deficits] : no focal deficits [Oriented To Time, Place, And Person] : oriented to person, place, and time [Affect] : the affect was normal [Mood] : the mood was normal [Not Anxious] : not anxious [Inguinal Lymph Nodes Enlarged Bilaterally] : inguinal

## 2023-01-17 NOTE — LETTER BODY
[Dear  ___] : Dear  [unfilled], [Courtesy Letter:] : I had the pleasure of seeing your patient, [unfilled], in my office today. [Please see my note below.] : Please see my note below. [Sincerely,] : Sincerely, [FreeTextEntry3] : Ed\par \par Jonny Webb MD\par [facility]\par  of Urology\par [employer] [facility]\par

## 2023-01-25 LAB
PSA FREE FLD-MCNC: 13 %
PSA FREE SERPL-MCNC: 0.61 NG/ML
PSA SERPL-MCNC: 4.75 NG/ML

## 2023-02-10 ENCOUNTER — OUTPATIENT (OUTPATIENT)
Dept: OUTPATIENT SERVICES | Facility: HOSPITAL | Age: 63
LOS: 1 days | End: 2023-02-10
Payer: COMMERCIAL

## 2023-02-10 ENCOUNTER — RESULT REVIEW (OUTPATIENT)
Age: 63
End: 2023-02-10

## 2023-02-10 ENCOUNTER — APPOINTMENT (OUTPATIENT)
Dept: MRI IMAGING | Facility: CLINIC | Age: 63
End: 2023-02-10
Payer: COMMERCIAL

## 2023-02-10 DIAGNOSIS — R97.20 ELEVATED PROSTATE SPECIFIC ANTIGEN [PSA]: ICD-10-CM

## 2023-02-10 PROCEDURE — 76498P: CUSTOM | Mod: 26

## 2023-02-10 PROCEDURE — 72197 MRI PELVIS W/O & W/DYE: CPT | Mod: 26

## 2023-02-10 PROCEDURE — 76498 UNLISTED MR PROCEDURE: CPT

## 2023-02-10 PROCEDURE — 72197 MRI PELVIS W/O & W/DYE: CPT

## 2023-02-10 PROCEDURE — A9585: CPT

## 2023-02-14 ENCOUNTER — APPOINTMENT (OUTPATIENT)
Dept: UROLOGY | Facility: CLINIC | Age: 63
End: 2023-02-14
Payer: COMMERCIAL

## 2023-02-14 VITALS — HEART RATE: 121 BPM | SYSTOLIC BLOOD PRESSURE: 157 MMHG | DIASTOLIC BLOOD PRESSURE: 84 MMHG

## 2023-02-14 PROCEDURE — 99213 OFFICE O/P EST LOW 20 MIN: CPT

## 2023-02-14 NOTE — HISTORY OF PRESENT ILLNESS
[FreeTextEntry1] : The patient presents with prostate studies.  He has been having good FOS. no nocturia or once. no dysuria or hematuria. no urgency.

## 2023-02-14 NOTE — ASSESSMENT
[FreeTextEntry1] : Impression:\par \par elevated PSA, improved\par \par Plan:\par \par follow up in 6 months with free and total PSA

## 2023-02-14 NOTE — PHYSICAL EXAM
[Abdomen Soft] : soft [Abdomen Tenderness] : non-tender [Costovertebral Angle Tenderness] : no ~M costovertebral angle tenderness [Edema] : no peripheral edema [] : no respiratory distress [Respiration, Rhythm And Depth] : normal respiratory rhythm and effort [Exaggerated Use Of Accessory Muscles For Inspiration] : no accessory muscle use [Oriented To Time, Place, And Person] : oriented to person, place, and time [Affect] : the affect was normal [Mood] : the mood was normal [Not Anxious] : not anxious [Normal Station and Gait] : the gait and station were normal for the patient's age

## 2023-02-14 NOTE — LETTER BODY
[Dear  ___] : Dear  [unfilled], [Courtesy Letter:] : I had the pleasure of seeing your patient, [unfilled], in my office today. [Please see my note below.] : Please see my note below. [Sincerely,] : Sincerely, [FreeTextEntry3] : Ed\par \par Jonny Webb MD\par [facility]\par  of Urology\par [employer] at [facility]\par

## 2023-04-03 ENCOUNTER — OFFICE (OUTPATIENT)
Dept: URBAN - METROPOLITAN AREA CLINIC 113 | Facility: CLINIC | Age: 63
Setting detail: OPHTHALMOLOGY
End: 2023-04-03
Payer: COMMERCIAL

## 2023-04-03 DIAGNOSIS — T15.12XA: ICD-10-CM

## 2023-04-03 DIAGNOSIS — T15.12XD: ICD-10-CM

## 2023-04-03 DIAGNOSIS — T15.11XD: ICD-10-CM

## 2023-04-03 DIAGNOSIS — T15.11XA: ICD-10-CM

## 2023-04-03 DIAGNOSIS — T15.11XS: ICD-10-CM

## 2023-04-03 PROBLEM — H25.13 CATARACT SENILE NUCLEAR SCLEROSIS; BOTH EYES: Status: ACTIVE | Noted: 2023-04-03

## 2023-04-03 PROBLEM — H16.223 DRY EYE SYNDROME K SICCA; BOTH EYES: Status: ACTIVE | Noted: 2023-04-03

## 2023-04-03 PROBLEM — T15.12XS CONJUNCTIVAL FOREIGN BODY ; LEFT EYE: Status: ACTIVE | Noted: 2023-04-03

## 2023-04-03 PROCEDURE — 92012 INTRM OPH EXAM EST PATIENT: CPT | Performed by: STUDENT IN AN ORGANIZED HEALTH CARE EDUCATION/TRAINING PROGRAM

## 2023-04-03 PROCEDURE — 65210 REMOVE FOREIGN BODY FROM EYE: CPT | Performed by: STUDENT IN AN ORGANIZED HEALTH CARE EDUCATION/TRAINING PROGRAM

## 2023-04-03 ASSESSMENT — REFRACTION_CURRENTRX
OD_AXIS: 108
OS_OVR_VA: 20/
OD_SPHERE: -1.00
OS_VPRISM_DIRECTION: SV
OD_OVR_VA: 20/
OD_VPRISM_DIRECTION: SV
OS_SPHERE: -0.75
OS_AXIS: 135
OS_CYLINDER: -0.25
OD_CYLINDER: -0.25

## 2023-04-03 ASSESSMENT — LID EXAM ASSESSMENTS
OD_MEIBOMITIS: 1+
OS_TRICHIASIS: LLL
OD_BLEPHARITIS: RLL RUL T
OS_COMMENTS: LIDS EVERTED
OS_BLEPHARITIS: LLL LUL T
OS_MEIBOMITIS: 1+

## 2023-04-03 ASSESSMENT — KERATOMETRY
OS_AXISANGLE_DEGREES: 047
OS_K2POWER_DIOPTERS: 42.75
OD_K1POWER_DIOPTERS: 43.00
OS_K1POWER_DIOPTERS: 42.25
METHOD_AUTO_MANUAL: AUTO
OD_K2POWER_DIOPTERS: 43.25
OD_AXISANGLE_DEGREES: 044

## 2023-04-03 ASSESSMENT — REFRACTION_AUTOREFRACTION
OS_AXIS: 115
OS_CYLINDER: -0.50
OD_AXIS: 111
OS_SPHERE: 0.00
OD_CYLINDER: -0.75
OD_SPHERE: -0.50

## 2023-04-03 ASSESSMENT — AXIALLENGTH_DERIVED
OS_AL: 24.066
OD_AL: 24.0805
OS_AL: 24.2704
OD_AL: 23.9794
OD_AL: 24.1824
OS_AL: 24.1167

## 2023-04-03 ASSESSMENT — REFRACTION_MANIFEST
OS_SPHERE: -0.25
OD_VA1: 20/20
OS_CYLINDER: -0.25
OD_CYLINDER: -0.75
OD_CYLINDER: +0.75
OS_VA1: 20/20
OD_SPHERE: -1.50
OD_ADD: +1.25
OS_SPHERE: -1.00
OD_SPHERE: -0.25
OS_VA1: 20/20
OS_VA2: 20/20
OD_AXIS: 025
OS_AXIS: 121
OD_VA2: 20/20
OD_AXIS: 112
OD_VA1: 20/20
OS_AXIS: 050
OS_CYLINDER: +0.50
OS_ADD: +1.25

## 2023-04-03 ASSESSMENT — TONOMETRY
OS_IOP_MMHG: 20
OD_IOP_MMHG: 20

## 2023-04-03 ASSESSMENT — SPHEQUIV_DERIVED
OD_SPHEQUIV: -1.125
OD_SPHEQUIV: -0.625
OD_SPHEQUIV: -0.875
OS_SPHEQUIV: -0.25
OS_SPHEQUIV: -0.75
OS_SPHEQUIV: -0.375

## 2023-04-03 ASSESSMENT — SUPERFICIAL PUNCTATE KERATITIS (SPK)
OD_SPK: T
OS_SPK: T

## 2023-04-03 ASSESSMENT — VISUAL ACUITY
OD_BCVA: 20/20
OS_BCVA: 20/20

## 2023-04-20 ENCOUNTER — APPOINTMENT (OUTPATIENT)
Dept: ORTHOPEDIC SURGERY | Facility: CLINIC | Age: 63
End: 2023-04-20
Payer: COMMERCIAL

## 2023-04-20 VITALS — HEIGHT: 68 IN | BODY MASS INDEX: 36.37 KG/M2 | WEIGHT: 240 LBS

## 2023-04-20 PROCEDURE — 99204 OFFICE O/P NEW MOD 45 MIN: CPT | Mod: 25

## 2023-04-20 PROCEDURE — 73564 X-RAY EXAM KNEE 4 OR MORE: CPT | Mod: LT

## 2023-04-20 PROCEDURE — 20610 DRAIN/INJ JOINT/BURSA W/O US: CPT | Mod: LT

## 2023-04-20 NOTE — DISCUSSION/SUMMARY
[de-identified] : (Assessment and Plan)\par \par History, clinical examination and imaging were most consistent with:\par -left knee patellofemoral osteoarthritis\par \par The diagnosis was explained in detail. The potential non-surgical and surgical treatments were reviewed. The relative risks and benefits of each option were considered relative to the patient’s age, activity level, medical history, symptom severity and previously attempted treatments. \par \par -Non-surgical treatment was selected. \par -Patient defers formal PT and will proceed with a home exercise program. \par -Cortisone injection will be performed for symptom relief.\par -Over the counter NSAID as needed for pain. GI precautions discussed.\par -The added clinical utility of an MRI was discussed. The patient deferred further diagnostic testing at this time.\par -Follow up in 6 to 8 weeks if symptoms persist or fail to improve. Consider MRI versus PT and HA injections. \par \par (MDM) \par \par Problem Complexity\par -Moderate: chronic illness with exacerbation\par \par Risk\par -Moderate: injection\par \par -Patient has not been seen by another provider in my practice within the past 2 years who specializes in orthopedic sports medicine, shoulder or elbow surgery. \par \par The patient was advised to consult with their primary medical provider prior to initiation of any new medications to reduce the risk of adverse effects specific to their long-term home medications and medical history. The risk of gastrointestinal irritation and kidney injury specific to long-term NSAID use was discussed.   \par \par (Procedure Note)\par \par Injection location was the:\par -left knee joint\par \par Injection contents were: \par 40 mg of kenalog and 5 mL of 1% lidocaine\par \par Procedure Details: \par -Informed consent was obtained. Discussed possible risks of corticosteroid injection including hyperglycemia, infection and skin discoloration. \par -Discussed that due to infection risk, an interval between injection and any future surgery is 6 weeks for arthroscopy and 3 months for arthroplasty.\par -Injection performed using aseptic technique. Hemostasis was confirmed.\par -Procedure was tolerated well with no complications. \par \par

## 2023-04-20 NOTE — IMAGING
[All Views] : anteroposterior, lateral, skyline, and anteroposterior standing [Mild tricompartmental OA medial narrowing] : Mild tricompartmental OA medial narrowing [Mild patellofemoral OA] : Mild patellofemoral OA [de-identified] : (Physical Exam: Knee)\par \par Laterality is left\par \par Patient is in no acute distress, alert and oriented\par Sensation is grossly intact to light touch in the foot\par Motor function is 5/5 in the foot\par Capillary refill is less than 2 seconds in the toes\par Lymphadenopathy is not present\par Peripheral edema is not present\par \par Skin is intact \par Effusion is not present \par Atrophy is not present\par Antalgic gait is not present \par \par Medial joint line tenderness is not present \par Lateral joint line tenderness is not present \par Patellar tenderness is present \par Calf tenderness is not present \par \par Knee extension is 0\par Knee flexion is 125\par \par Quadriceps strength is 5/5\par Hamstring strength is 5/5\par \par Lachman is normal \par Posterior drawer is normal\par Varus stress stability is normal\par Valgus stress stability is normal\par \par Medial Lacho test is normal\par Lateral Lacho test is normal\par Patellofemoral grind test is abnormal\par Patellar apprehension test is normal\par

## 2023-04-20 NOTE — HISTORY OF PRESENT ILLNESS
[de-identified] : Patient age in years is 62\par Occupation is\par \par Body part causing symptoms is the left knee\par Symptoms began intermittent for about 10 years \par Location of pain is anterior\par Worse with kneeling and deep bending\par Quality of pain is aching\par Pain score at rest is 4\par Pain score during activity is 6\par Radicular symptoms are not present\par Prior treatments include Voltaren topical cream, NSAIDS prn \par Patient's condition is not associated with worker’s compensation, no-fault or interscholastic athletics\par Patient had knee scope 10 years ago\par He denies mechanical symptoms

## 2023-06-08 ENCOUNTER — APPOINTMENT (OUTPATIENT)
Dept: ORTHOPEDIC SURGERY | Facility: CLINIC | Age: 63
End: 2023-06-08
Payer: COMMERCIAL

## 2023-06-08 VITALS — WEIGHT: 240 LBS | HEIGHT: 68 IN | BODY MASS INDEX: 36.37 KG/M2

## 2023-06-08 PROCEDURE — 99213 OFFICE O/P EST LOW 20 MIN: CPT

## 2023-06-08 NOTE — DISCUSSION/SUMMARY
[de-identified] : (Assessment and Plan)\par \par History, clinical examination and imaging were most consistent with:\par -left knee lateral meniscus tear and patellofemoral osteoarthritis\par \par The diagnosis was explained in detail. The potential non-surgical and surgical treatments were reviewed. The relative risks and benefits of each option were considered relative to the patient’s age, activity level, medical history, symptom severity and previously attempted treatments. \par \par The patient was advised to consult with their primary medical provider prior to initiation of any new medications to reduce the risk of adverse effects specific to their long-term home medications and medical history. The risk of gastrointestinal irritation and kidney injury specific to long-term NSAID use was discussed.   \par \par -Over the counter acetaminophen as needed for pain. \par -The patient has persistent symptoms despite a trial of non-surgical treatment. An MRI was therefore ordered to evaluate for structural abnormality and further guide treatment recommendations.\par -Patient cannot take NSAIDs and we would consider HA injections if CSI was not effective. \par -Follow up when MRI completed. We would likely discuss repeat injection and PT before considering surgical options. \par \par \par (MDM) \par \par Problem Complexity\par -Moderate: chronic illness with exacerbation\par \par Risk\par -Low: over the counter medication\par

## 2023-06-08 NOTE — IMAGING
[de-identified] : (Physical Exam: Knee)\par \par Laterality is left\par \par Patient is in no acute distress, alert and oriented\par Sensation is grossly intact to light touch in the foot\par Motor function is 5/5 in the foot\par Capillary refill is less than 2 seconds in the toes\par Lymphadenopathy is not present\par Peripheral edema is not present\par \par Skin is intact \par Effusion is not present \par Atrophy is not present\par Antalgic gait is not present \par \par Medial joint line tenderness is not present \par Lateral joint line tenderness is present \par Patellar tenderness is present \par Calf tenderness is not present \par \par Knee extension is 0\par Knee flexion is 125\par \par Quadriceps strength is 5/5\par Hamstring strength is 5/5\par \par Lachman is normal \par Posterior drawer is normal\par Varus stress stability is normal\par Valgus stress stability is normal\par \par Medial Lacho test is normal\par Lateral Lacho test is abnormal\par Patellofemoral grind test is abnormal\par Patellar apprehension test is normal\par

## 2023-06-08 NOTE — HISTORY OF PRESENT ILLNESS
[de-identified] : (History of Present Illness)\par \par Body part causing symptoms is the  left knee\par Pain score at rest is  1\par Pain score during activity is 4\par Treatments since last visit include CSI which helped for a month\par Reports symptoms last week got worse no injury

## 2023-06-12 ENCOUNTER — FORM ENCOUNTER (OUTPATIENT)
Age: 63
End: 2023-06-12

## 2023-06-13 ENCOUNTER — NON-APPOINTMENT (OUTPATIENT)
Age: 63
End: 2023-06-13

## 2023-06-13 ENCOUNTER — APPOINTMENT (OUTPATIENT)
Dept: MRI IMAGING | Facility: CLINIC | Age: 63
End: 2023-06-13
Payer: COMMERCIAL

## 2023-06-13 PROCEDURE — 73721 MRI JNT OF LWR EXTRE W/O DYE: CPT | Mod: LT

## 2023-06-22 ENCOUNTER — APPOINTMENT (OUTPATIENT)
Dept: ORTHOPEDIC SURGERY | Facility: CLINIC | Age: 63
End: 2023-06-22
Payer: COMMERCIAL

## 2023-06-22 PROCEDURE — 20610 DRAIN/INJ JOINT/BURSA W/O US: CPT | Mod: LT

## 2023-06-22 PROCEDURE — 99214 OFFICE O/P EST MOD 30 MIN: CPT | Mod: 25

## 2023-06-22 NOTE — IMAGING
[de-identified] : (Physical Exam: Knee)\par \par Laterality is left\par \par Patient is in no acute distress, alert and oriented\par Sensation is grossly intact to light touch in the foot\par Motor function is 5/5 in the foot\par Capillary refill is less than 2 seconds in the toes\par Lymphadenopathy is not present\par Peripheral edema is not present\par \par Skin is intact \par Effusion is not present \par Atrophy is not present\par Antalgic gait is not present \par \par Medial joint line tenderness is not present \par Lateral joint line tenderness is present \par Patellar tenderness is present \par Calf tenderness is not present \par \par Knee extension is 0\par Knee flexion is 125\par \par Quadriceps strength is 5/5\par Hamstring strength is 5/5\par \par Lachman is normal \par Posterior drawer is normal\par Varus stress stability is normal\par Valgus stress stability is normal\par \par Medial Lacho test is normal\par Lateral Lacho test is normal\par Patellofemoral grind test is abnormal\par Patellar apprehension test is normal\par

## 2023-06-22 NOTE — HISTORY OF PRESENT ILLNESS
[de-identified] : (History of Present Illness)\par \par Body part causing symptoms is the  left knee\par Pain score at rest is  3\par Pain score during activity is 6\par Treatments since last visit include NSAIDs\par He denies mechanical symptoms\par Here for MRI results

## 2023-06-22 NOTE — DATA REVIEWED
[MRI] : MRI [Left] : left [Knee] : knee [Report was reviewed and noted in the chart] : The report was reviewed and noted in the chart [I reviewed the films/CD and agree] : I reviewed the films/CD and agree [FreeTextEntry1] : tearing of the free edge and undersurface of the posterior horn medial meniscus, mild to modertae cartilage degeneration, ACL mucoid change with cyst, no fracture, lateral meniscus intact

## 2023-06-22 NOTE — DISCUSSION/SUMMARY
[de-identified] : (Assessment and Plan)\par \par History, clinical examination and imaging were most consistent with:\par -left knee degenerative medial meniscus tear and mild osteoarthritis\par \par The diagnosis was explained in detail. The potential non-surgical and surgical treatments were reviewed. The relative risks and benefits of each option were considered relative to the patient’s age, activity level, medical history, symptom severity and previously attempted treatments. \par \par The patient was advised to consult with their primary medical provider prior to initiation of any new medications to reduce the risk of adverse effects specific to their long-term home medications and medical history. The risk of gastrointestinal irritation and kidney injury specific to long-term NSAID use was discussed.   \par \par -Cortisone injection will be performed for symptom relief.\par -Patient will proceed with formal PT.\par -Authorization will be submitted for HA injections due to persistent pain and degenerative changes. \par -Over the counter acetaminophen as needed for pain. \par -Arthroscopic surgery is not recommended at this time due to cartilage degeneration. \par -Follow up when HA injections approved. \par \par \par (MDM) \par \par Problem Complexity\par -Moderate: chronic illness with exacerbation\par \par Risk\par -Moderate: injection\par \par (Procedure Note)\par \par Injection location was the:\par -left knee joint\par \par Injection contents were: \par 40 mg of kenalog and 5 mL of 1% lidocaine\par \par Procedure Details: \par -Informed consent was obtained. Discussed possible risks of corticosteroid injection including hyperglycemia, infection and skin discoloration. \par -Discussed that due to infection risk, an interval between injection and any future surgery is 6 weeks for arthroscopy and 3 months for arthroplasty.\par -Injection performed using aseptic technique. Hemostasis was confirmed.\par -Procedure was tolerated well with no complications. \par \par \par

## 2023-07-24 ENCOUNTER — APPOINTMENT (OUTPATIENT)
Dept: GASTROENTEROLOGY | Facility: CLINIC | Age: 63
End: 2023-07-24
Payer: COMMERCIAL

## 2023-07-24 VITALS
DIASTOLIC BLOOD PRESSURE: 81 MMHG | BODY MASS INDEX: 36.37 KG/M2 | WEIGHT: 240 LBS | SYSTOLIC BLOOD PRESSURE: 147 MMHG | RESPIRATION RATE: 14 BRPM | HEART RATE: 80 BPM | TEMPERATURE: 98 F | OXYGEN SATURATION: 98 % | HEIGHT: 68 IN

## 2023-07-24 PROCEDURE — 99214 OFFICE O/P EST MOD 30 MIN: CPT

## 2023-07-24 NOTE — ASSESSMENT
[FreeTextEntry1] : Impression: Crohn's colitis well-controlled with current antacid therapy with a negative colonoscopy done in January of this year.\par \par Recommendations: Repeat colonoscopy in 2025 or 2 years from his last colonoscopy in 2023 for continued dysplasia surveillance.  He is to continue current antacid therapy and a new prescription was sent to his mail away pharmacy today.  He is to return here in 6 months time for follow-up evaluation or sooner if necessary and appeared to understand all of the above instructions, information, and management plan.

## 2023-07-25 LAB
PSA FREE FLD-MCNC: 16 %
PSA FREE SERPL-MCNC: 0.78 NG/ML
PSA SERPL-MCNC: 4.73 NG/ML

## 2023-08-01 ENCOUNTER — APPOINTMENT (OUTPATIENT)
Dept: UROLOGY | Facility: CLINIC | Age: 63
End: 2023-08-01
Payer: COMMERCIAL

## 2023-08-01 PROCEDURE — 99213 OFFICE O/P EST LOW 20 MIN: CPT

## 2023-08-01 NOTE — HISTORY OF PRESENT ILLNESS
[FreeTextEntry1] : Patient presents with elevated PSA. No hematuria or dysuria. no urgency or flank pain. no post void fullness.

## 2023-08-01 NOTE — ASSESSMENT
[FreeTextEntry1] : Impression:  mildly elevated PSA Previous PIRADS 2. PSA stable from 6 months ago.   Plan:  free and total PSA in 6 months.

## 2023-08-01 NOTE — LETTER BODY
[Dear  ___] : Dear  [unfilled], [Courtesy Letter:] : I had the pleasure of seeing your patient, [unfilled], in my office today. [Please see my note below.] : Please see my note below. [Sincerely,] : Sincerely, [FreeTextEntry3] : Ed  Jonny Webb MD [facility]  of [facility] [place] [facility] [employer] at [facility] [DrJ Luis  ___] : Dr. PEREZ

## 2023-08-03 ENCOUNTER — APPOINTMENT (OUTPATIENT)
Dept: ORTHOPEDIC SURGERY | Facility: CLINIC | Age: 63
End: 2023-08-03
Payer: COMMERCIAL

## 2023-08-03 VITALS — BODY MASS INDEX: 36.37 KG/M2 | WEIGHT: 240 LBS | HEIGHT: 68 IN

## 2023-08-03 PROCEDURE — 20610 DRAIN/INJ JOINT/BURSA W/O US: CPT | Mod: LT

## 2023-08-03 PROCEDURE — 99214 OFFICE O/P EST MOD 30 MIN: CPT | Mod: 25

## 2023-08-03 NOTE — IMAGING
[de-identified] : (Physical Exam: Knee)\par  \par  Laterality is left\par  \par  Patient is in no acute distress, alert and oriented\par  Sensation is grossly intact to light touch in the foot\par  Motor function is 5/5 in the foot\par  Capillary refill is less than 2 seconds in the toes\par  Lymphadenopathy is not present\par  Peripheral edema is not present\par  \par  Skin is intact \par  Effusion is not present \par  Atrophy is not present\par  Antalgic gait is not present \par  \par  Medial joint line tenderness is not present \par  Lateral joint line tenderness is present \par  Patellar tenderness is present \par  Calf tenderness is not present \par  \par  Knee extension is 0\par  Knee flexion is 125\par  \par  Quadriceps strength is 5/5\par  Hamstring strength is 5/5\par  \par  Lachman is normal \par  Posterior drawer is normal\par  Varus stress stability is normal\par  Valgus stress stability is normal\par  \par  Medial Lacho test is normal\par  Lateral Lacho test is abnormal\par  Patellofemoral grind test is abnormal\par  Patellar apprehension test is normal\par

## 2023-08-03 NOTE — HISTORY OF PRESENT ILLNESS
[de-identified] : Body part causing symptoms is the left knee Pain score at rest is  1 Pain score during activity is 4 Treatment since last visit include CSI, provided partial relief but pain has recurred Here today to begin Euflexxa injections

## 2023-08-03 NOTE — DATA REVIEWED
[MRI] : MRI [Left] : left [Knee] : knee [Report was reviewed and noted in the chart] : The report was reviewed and noted in the chart [I reviewed the films/CD and agree] : I reviewed the films/CD and agree [FreeTextEntry1] : intact medial and lateral meniscus with degenerative fraying, mild PF and medial compartment chondromalacia

## 2023-08-03 NOTE — DISCUSSION/SUMMARY
[de-identified] : (Assessment and Plan)   History, clinical examination and imaging were most consistent with: -left knee osteoarthritis   The diagnosis was explained in detail. The potential non-surgical and surgical treatments were reviewed. The relative risks and benefits of each option were considered relative to the patients age, activity level, medical history, symptom severity and previously attempted treatments.   The patient was advised to consult with their primary medical provider prior to initiation of any new medications to reduce the risk of adverse effects specific to their long-term home medications and medical history. The risk of gastrointestinal irritation and kidney injury specific to long-term NSAID use was discussed.     -Hyaluronic acid injection will be performed for symptom relief. The patient has severe degenerative joint disease that has not improved with multiple non-surgical modalities including cortisone injection, rest and oral pain medication. -Over the counter acetaminophen as needed for pain. -We discussed that he does not have meniscal tear on MRI and that arthroscopic debridement would only be considered as a last resort treatment if symptoms persisted after injections. Ultimately, he may require knee replacement to definitively address his arthritic changes.  -Follow up in 1 week for next injection.     (MDM)   Problem Complexity -Moderate: chronic illness with exacerbation   Risk -Moderate: injection   (Procedure Note)   Injection location was the: -left knee joint  Injection contents were: Euflexxa   Procedure Details: -Informed consent was obtained. Discussed possible risks of hyaluronic acid injection including infection and local inflammatory reaction. -Discussed that due to infection risk, an interval between injection and any future surgery is 6 weeks for arthroscopy and 3 months for arthroplasty. -Injection performed using aseptic technique. Hemostasis was confirmed. -Procedure was tolerated well with no complications.

## 2023-08-10 ENCOUNTER — APPOINTMENT (OUTPATIENT)
Dept: ORTHOPEDIC SURGERY | Facility: CLINIC | Age: 63
End: 2023-08-10
Payer: COMMERCIAL

## 2023-08-10 VITALS — HEIGHT: 68 IN | BODY MASS INDEX: 36.37 KG/M2 | WEIGHT: 240 LBS

## 2023-08-10 PROCEDURE — 20610 DRAIN/INJ JOINT/BURSA W/O US: CPT | Mod: LT

## 2023-08-10 PROCEDURE — 99214 OFFICE O/P EST MOD 30 MIN: CPT | Mod: 25

## 2023-08-10 NOTE — HISTORY OF PRESENT ILLNESS
[5] : 5 [Retired] : Work status: retired [2] : 2 [Euflexxa] : Euflexxa [de-identified] : Body part causing symptoms is the left knee Pain score at rest is  2 Pain score during activity is 5 Treatment since last visit include Euflexxa injection #1, prior to that he had a CSI, provided partial relief but pain has recurred Here today to for Euflexxa #2 [] : no [FreeTextEntry1] : LEFT KNEE  [de-identified] : 8/3/23

## 2023-08-10 NOTE — DISCUSSION/SUMMARY
[de-identified] : (Assessment and Plan)   History, clinical examination and imaging were most consistent with: -left knee osteoarthritis   The diagnosis was explained in detail. The potential non-surgical and surgical treatments were reviewed. The relative risks and benefits of each option were considered relative to the patients age, activity level, medical history, symptom severity and previously attempted treatments.   The patient was advised to consult with their primary medical provider prior to initiation of any new medications to reduce the risk of adverse effects specific to their long-term home medications and medical history. The risk of gastrointestinal irritation and kidney injury specific to long-term NSAID use was discussed.     -Hyaluronic acid injection will be performed for symptom relief. The patient has severe degenerative joint disease that has not improved with multiple non-surgical modalities including cortisone injection, rest and oral pain medication. -Over the counter acetaminophen as needed for pain. -We discussed that he does not have meniscal tear on MRI and that arthroscopic debridement would only be considered as a last resort treatment if symptoms persisted after injections. Ultimately, he may require knee replacement to definitively address his arthritic changes.  -Follow up in 1 week to complete injection series.     Problem Complexity -Moderate: chronic illness with exacerbation   Risk -Moderate: injection   (Procedure Note)   Injection location was the: -left knee joint  Injection contents were: Euflexxa   Procedure Details: -Informed consent was obtained. Discussed possible risks of hyaluronic acid injection including infection and local inflammatory reaction. -Discussed that due to infection risk, an interval between injection and any future surgery is 6 weeks for arthroscopy and 3 months for arthroplasty. -Injection performed using aseptic technique. Hemostasis was confirmed. -Procedure was tolerated well with no complications.

## 2023-08-10 NOTE — IMAGING
[de-identified] : (Physical Exam: Knee)\par  \par  Laterality is left\par  \par  Patient is in no acute distress, alert and oriented\par  Sensation is grossly intact to light touch in the foot\par  Motor function is 5/5 in the foot\par  Capillary refill is less than 2 seconds in the toes\par  Lymphadenopathy is not present\par  Peripheral edema is not present\par  \par  Skin is intact \par  Effusion is not present \par  Atrophy is not present\par  Antalgic gait is not present \par  \par  Medial joint line tenderness is not present \par  Lateral joint line tenderness is present \par  Patellar tenderness is present \par  Calf tenderness is not present \par  \par  Knee extension is 0\par  Knee flexion is 125\par  \par  Quadriceps strength is 5/5\par  Hamstring strength is 5/5\par  \par  Lachman is normal \par  Posterior drawer is normal\par  Varus stress stability is normal\par  Valgus stress stability is normal\par  \par  Medial Lacho test is normal\par  Lateral Lacho test is abnormal\par  Patellofemoral grind test is abnormal\par  Patellar apprehension test is normal\par

## 2023-08-24 ENCOUNTER — APPOINTMENT (OUTPATIENT)
Dept: ORTHOPEDIC SURGERY | Facility: CLINIC | Age: 63
End: 2023-08-24
Payer: COMMERCIAL

## 2023-08-24 VITALS — HEIGHT: 68 IN | WEIGHT: 240 LBS | BODY MASS INDEX: 36.37 KG/M2

## 2023-08-24 PROCEDURE — 20610 DRAIN/INJ JOINT/BURSA W/O US: CPT | Mod: LT

## 2023-08-24 PROCEDURE — 99214 OFFICE O/P EST MOD 30 MIN: CPT | Mod: 25

## 2023-08-24 NOTE — HISTORY OF PRESENT ILLNESS
[de-identified] : Body part causing symptoms is the left knee Pain score at rest is  1 Pain score during activity is 4 Here today to continue Euflexxa injections, today in #3  Reports partial relief

## 2023-08-24 NOTE — DISCUSSION/SUMMARY
[de-identified] : (Assessment and Plan)   History, clinical examination and imaging were most consistent with: -left knee osteoarthritis   The diagnosis was explained in detail. The potential non-surgical and surgical treatments were reviewed. The relative risks and benefits of each option were considered relative to the patients age, activity level, medical history, symptom severity and previously attempted treatments.   The patient was advised to consult with their primary medical provider prior to initiation of any new medications to reduce the risk of adverse effects specific to their long-term home medications and medical history. The risk of gastrointestinal irritation and kidney injury specific to long-term NSAID use was discussed.     -Hyaluronic acid injection will be performed for symptom relief. The patient has severe degenerative joint disease that has not improved with multiple non-surgical modalities including cortisone injection, rest and oral pain medication. -Over the counter acetaminophen as needed for pain. -We discussed that he does not have meniscal tear on MRI and that arthroscopic debridement would only be considered as a last resort treatment if symptoms persisted after injections. Ultimately, he may require knee replacement to definitively address his arthritic changes.  -Follow up in 2 months, if symptoms persist consider repeat CSI versus surgical discussion.     (MDM)   Problem Complexity -Moderate: chronic illness with exacerbation   Risk -Moderate: injection   (Procedure Note)   Injection location was the: -left knee joint  Injection contents were: Euflexxa   Procedure Details: -Informed consent was obtained. Discussed possible risks of hyaluronic acid injection including infection and local inflammatory reaction. -Discussed that due to infection risk, an interval between injection and any future surgery is 6 weeks for arthroscopy and 3 months for arthroplasty. -Injection performed using aseptic technique. Hemostasis was confirmed. -Procedure was tolerated well with no complications.

## 2023-08-24 NOTE — IMAGING
[de-identified] : (Physical Exam: Knee)\par  \par  Laterality is left\par  \par  Patient is in no acute distress, alert and oriented\par  Sensation is grossly intact to light touch in the foot\par  Motor function is 5/5 in the foot\par  Capillary refill is less than 2 seconds in the toes\par  Lymphadenopathy is not present\par  Peripheral edema is not present\par  \par  Skin is intact \par  Effusion is not present \par  Atrophy is not present\par  Antalgic gait is not present \par  \par  Medial joint line tenderness is not present \par  Lateral joint line tenderness is present \par  Patellar tenderness is present \par  Calf tenderness is not present \par  \par  Knee extension is 0\par  Knee flexion is 125\par  \par  Quadriceps strength is 5/5\par  Hamstring strength is 5/5\par  \par  Lachman is normal \par  Posterior drawer is normal\par  Varus stress stability is normal\par  Valgus stress stability is normal\par  \par  Medial Lacho test is normal\par  Lateral Lacho test is abnormal\par  Patellofemoral grind test is abnormal\par  Patellar apprehension test is normal\par

## 2023-10-28 ENCOUNTER — NON-APPOINTMENT (OUTPATIENT)
Age: 63
End: 2023-10-28

## 2023-11-16 ENCOUNTER — APPOINTMENT (OUTPATIENT)
Dept: ORTHOPEDIC SURGERY | Facility: CLINIC | Age: 63
End: 2023-11-16
Payer: COMMERCIAL

## 2023-11-16 VITALS — BODY MASS INDEX: 36.37 KG/M2 | HEIGHT: 68 IN | WEIGHT: 240 LBS

## 2023-11-16 PROCEDURE — 99213 OFFICE O/P EST LOW 20 MIN: CPT | Mod: 25

## 2023-11-16 PROCEDURE — 20610 DRAIN/INJ JOINT/BURSA W/O US: CPT | Mod: LT

## 2024-01-09 ENCOUNTER — OFFICE (OUTPATIENT)
Dept: URBAN - METROPOLITAN AREA CLINIC 113 | Facility: CLINIC | Age: 64
Setting detail: OPHTHALMOLOGY
End: 2024-01-09
Payer: MEDICARE

## 2024-01-09 DIAGNOSIS — H01.002: ICD-10-CM

## 2024-01-09 DIAGNOSIS — H52.7: ICD-10-CM

## 2024-01-09 DIAGNOSIS — H01.004: ICD-10-CM

## 2024-01-09 DIAGNOSIS — H01.001: ICD-10-CM

## 2024-01-09 DIAGNOSIS — H01.005: ICD-10-CM

## 2024-01-09 DIAGNOSIS — H25.13: ICD-10-CM

## 2024-01-09 DIAGNOSIS — H16.223: ICD-10-CM

## 2024-01-09 PROCEDURE — 99213 OFFICE O/P EST LOW 20 MIN: CPT | Performed by: STUDENT IN AN ORGANIZED HEALTH CARE EDUCATION/TRAINING PROGRAM

## 2024-01-09 PROCEDURE — 92015 DETERMINE REFRACTIVE STATE: CPT | Performed by: STUDENT IN AN ORGANIZED HEALTH CARE EDUCATION/TRAINING PROGRAM

## 2024-01-09 ASSESSMENT — REFRACTION_CURRENTRX
OS_CYLINDER: -0.25
OS_CYLINDER: -0.25
OD_OVR_VA: 20/
OD_AXIS: 108
OD_VPRISM_DIRECTION: SV
OD_SPHERE: -0.25
OS_OVR_VA: 20/
OS_VPRISM_DIRECTION: SV
OD_AXIS: 108
OD_CYLINDER: -0.25
OS_OVR_VA: 20/
OD_CYLINDER: -0.75
OD_SPHERE: -1.00
OD_VPRISM_DIRECTION: SV
OS_SPHERE: -0.75
OS_VPRISM_DIRECTION: SV
OS_SPHERE: -0.25
OS_AXIS: 135
OS_AXIS: 116
OD_OVR_VA: 20/

## 2024-01-09 ASSESSMENT — REFRACTION_MANIFEST
OD_SPHERE: -0.50
OS_VA2: 20/20
OS_VA1: 20/20
OD_CYLINDER: -0.50
OS_CYLINDER: -0.50
OD_AXIS: 114
OS_VA1: 20/20
OD_VA2: 20/20
OD_ADD: +1.75
OD_ADD: +1.25
OD_CYLINDER: -0.75
OS_SPHERE: PLANO
OS_AXIS: 124
OD_VA1: 20/20
OS_AXIS: 121
OS_SPHERE: -0.25
OS_ADD: +1.25
OD_VA1: 20/20
OD_AXIS: 112
OS_CYLINDER: -0.25
OD_VA2: 20/20
OD_SPHERE: -0.25
OS_ADD: +1.75
OS_VA2: 20/20

## 2024-01-09 ASSESSMENT — REFRACTION_AUTOREFRACTION
OS_CYLINDER: -0.50
OS_AXIS: 124
OD_SPHERE: -0.50
OD_AXIS: 114
OD_CYLINDER: -0.50
OS_SPHERE: +0.25

## 2024-01-09 ASSESSMENT — SUPERFICIAL PUNCTATE KERATITIS (SPK)
OS_SPK: T
OD_SPK: T

## 2024-01-09 ASSESSMENT — SPHEQUIV_DERIVED
OD_SPHEQUIV: -0.625
OS_SPHEQUIV: -0.375
OS_SPHEQUIV: 0
OD_SPHEQUIV: -0.75
OD_SPHEQUIV: -0.75

## 2024-01-09 ASSESSMENT — CONFRONTATIONAL VISUAL FIELD TEST (CVF)
OS_FINDINGS: FULL
OD_FINDINGS: FULL

## 2024-01-09 ASSESSMENT — LID EXAM ASSESSMENTS
OS_TRICHIASIS: ABSENT
OD_BLEPHARITIS: RLL RUL T
OS_COMMENTS: LIDS EVERTED
OD_MEIBOMITIS: 1+
OS_MEIBOMITIS: 1+
OS_BLEPHARITIS: LLL LUL T

## 2024-01-23 ENCOUNTER — APPOINTMENT (OUTPATIENT)
Dept: GASTROENTEROLOGY | Facility: CLINIC | Age: 64
End: 2024-01-23
Payer: COMMERCIAL

## 2024-01-23 VITALS
RESPIRATION RATE: 14 BRPM | OXYGEN SATURATION: 98 % | HEART RATE: 78 BPM | SYSTOLIC BLOOD PRESSURE: 138 MMHG | BODY MASS INDEX: 37.13 KG/M2 | HEIGHT: 68 IN | DIASTOLIC BLOOD PRESSURE: 80 MMHG | WEIGHT: 245 LBS

## 2024-01-23 DIAGNOSIS — K50.10 CROHN'S DISEASE OF LARGE INTESTINE W/OUT COMPLICATIONS: ICD-10-CM

## 2024-01-23 PROCEDURE — 99214 OFFICE O/P EST MOD 30 MIN: CPT

## 2024-01-23 RX ORDER — MESALAMINE 500 MG/1
500 CAPSULE, EXTENDED RELEASE ORAL
Qty: 720 | Refills: 3 | Status: DISCONTINUED | COMMUNITY
Start: 2017-08-18 | End: 2024-01-23

## 2024-01-23 NOTE — PHYSICAL EXAM
[Alert] : alert [Normal Voice/Communication] : normal voice/communication [Healthy Appearing] : healthy appearing [No Acute Distress] : no acute distress [Sclera] : the sclera and conjunctiva were normal [Hearing Threshold Finger Rub Not [place]] : hearing was normal [Normal Lips/Gums] : the lips and gums were normal [Normal Appearance] : the appearance of the neck was normal [No Neck Mass] : no neck mass was observed [No Respiratory Distress] : no respiratory distress [No Acc Muscle Use] : no accessory muscle use [Respiration, Rhythm And Depth] : normal respiratory rhythm and effort [Auscultation Breath Sounds / Voice Sounds] : lungs were clear to auscultation bilaterally [Heart Rate And Rhythm] : heart rate was normal and rhythm regular [Normal S1, S2] : normal S1 and S2 [Bowel Sounds] : normal bowel sounds [Abdomen Tenderness] : non-tender [No Masses] : no abdominal mass palpated [Abdomen Soft] : soft [] : no hepatosplenomegaly [Oriented To Time, Place, And Person] : oriented to person, place, and time

## 2024-01-23 NOTE — ADDENDUM
[FreeTextEntry1] : I, Becky Metcalf PA-C, acted as a scribe for the services dictated to me by SHELLIE Porter in this document on Jan 23, 2024 for SHANI SPENCER

## 2024-01-23 NOTE — HISTORY OF PRESENT ILLNESS
[FreeTextEntry1] :  1/5/23, internal hemorrhoids, negative for dysplasia  3/5/2021. Internal hemorrhoids. Negative for dysplasia biopsies.  1/17/2019 normal colonoscopy negative biopsies.  1/22/2017 normal colonoscopy. Negative biopsies.  4/9/2009. Sigmoid colitis negative biopsies.

## 2024-01-23 NOTE — ASSESSMENT
[FreeTextEntry1] : Patient is a 63 year old male, with PMH of Crohn's Colitis, who presents for follow up visit.  Pt due for colon cancer surveillance in January 2025. Refilled Pentasa 4g PO QD, SLICK per pt request.   RTC in 6 months, sooner if needed   I evaluated this patient with my ACP and agree with the above assessment and management plan.  Patient with stable Crohn's colitis maintained with Pentasa 4 g daily.  He is status post a negative colonoscopy with negative dysplasia surveillance biopsies taken throughout the colon in January of last year.  He has no lower GI symptoms or complaints at this time and his Pentasa prescription will be refilled and he will return here in 6 month's time for follow-up evaluation or sooner if necessary.  He appeared to understand all of the above instructions, information, and management plan.

## 2024-02-07 ENCOUNTER — LABORATORY RESULT (OUTPATIENT)
Age: 64
End: 2024-02-07

## 2024-02-15 ENCOUNTER — APPOINTMENT (OUTPATIENT)
Dept: UROLOGY | Facility: CLINIC | Age: 64
End: 2024-02-15
Payer: MEDICARE

## 2024-02-15 VITALS
DIASTOLIC BLOOD PRESSURE: 82 MMHG | SYSTOLIC BLOOD PRESSURE: 138 MMHG | HEIGHT: 68 IN | HEART RATE: 93 BPM | BODY MASS INDEX: 37.13 KG/M2 | WEIGHT: 245 LBS

## 2024-02-15 DIAGNOSIS — R97.20 ELEVATED PROSTATE, SPECIFIC ANTIGEN [PSA]: ICD-10-CM

## 2024-02-15 PROCEDURE — 99213 OFFICE O/P EST LOW 20 MIN: CPT

## 2024-02-15 NOTE — ASSESSMENT
[FreeTextEntry1] : Impression:  elevated PSA, improved.  Plan:  follow up one year with free and total PSA

## 2024-02-15 NOTE — PHYSICAL EXAM
[General Appearance - Well Developed] : well developed [Edema] : no peripheral edema [] : no respiratory distress [Normal Station and Gait] : the gait and station were normal for the patient's age [Skin Color & Pigmentation] : normal skin color and pigmentation [Oriented To Time, Place, And Person] : oriented to person, place, and time [Affect] : the affect was normal [Not Anxious] : not anxious

## 2024-02-15 NOTE — LETTER BODY
[Dear  ___] : Dear  [unfilled], [Courtesy Letter:] : I had the pleasure of seeing your patient, [unfilled], in my office today. [Please see my note below.] : Please see my note below. [Sincerely,] : Sincerely, [FreeTextEntry3] : Ed  Jonny Webb MD [facility]  of Urology [place] [facility] [employer] at [facility]

## 2024-02-22 ENCOUNTER — APPOINTMENT (OUTPATIENT)
Dept: ORTHOPEDIC SURGERY | Facility: CLINIC | Age: 64
End: 2024-02-22

## 2024-03-11 DIAGNOSIS — R93.5 ABNORMAL FINDINGS ON DIAGNOSTIC IMAGING OF OTHER ABDOMINAL REGIONS, INCLUDING RETROPERITONEUM: ICD-10-CM

## 2024-03-14 ENCOUNTER — APPOINTMENT (OUTPATIENT)
Dept: ORTHOPEDIC SURGERY | Facility: CLINIC | Age: 64
End: 2024-03-14
Payer: MEDICARE

## 2024-03-14 VITALS — BODY MASS INDEX: 36.37 KG/M2 | HEIGHT: 68 IN | WEIGHT: 240 LBS

## 2024-03-14 PROCEDURE — 99214 OFFICE O/P EST MOD 30 MIN: CPT | Mod: 25

## 2024-03-14 PROCEDURE — 20610 DRAIN/INJ JOINT/BURSA W/O US: CPT | Mod: LT

## 2024-03-14 NOTE — DATA REVIEWED
[Left] : left [MRI] : MRI [Knee] : knee [Report was reviewed and noted in the chart] : The report was reviewed and noted in the chart [I reviewed the films/CD and agree] : I reviewed the films/CD and agree [FreeTextEntry1] : intact medial and lateral meniscus with degenerative fraying, mild PF and medial compartment chondromalacia

## 2024-03-14 NOTE — IMAGING
[de-identified] : (Physical Exam: Knee)\par  \par  Laterality is left\par  \par  Patient is in no acute distress, alert and oriented\par  Sensation is grossly intact to light touch in the foot\par  Motor function is 5/5 in the foot\par  Capillary refill is less than 2 seconds in the toes\par  Lymphadenopathy is not present\par  Peripheral edema is not present\par  \par  Skin is intact \par  Effusion is not present \par  Atrophy is not present\par  Antalgic gait is not present \par  \par  Medial joint line tenderness is not present \par  Lateral joint line tenderness is present \par  Patellar tenderness is present \par  Calf tenderness is not present \par  \par  Knee extension is 0\par  Knee flexion is 125\par  \par  Quadriceps strength is 5/5\par  Hamstring strength is 5/5\par  \par  Lachman is normal \par  Posterior drawer is normal\par  Varus stress stability is normal\par  Valgus stress stability is normal\par  \par  Medial Lacho test is normal\par  Lateral Lacho test is abnormal\par  Patellofemoral grind test is abnormal\par  Patellar apprehension test is normal\par

## 2024-03-14 NOTE — HISTORY OF PRESENT ILLNESS
[de-identified] : 03/14/24: Follow up injection for the LT knee. CSI last visit with releif for a short while. would like to discuss additional injection options.   11/16/23: pt presents for FUV of the left knee s/p gel injection series. He reports it relieved symptoms for about 2 months and then returned. He is interested in another injection today  Body part causing symptoms is the left knee Pain score at rest is  1 Pain score during activity is 4 Here today to continue Euflexxa injections, today in #3  Reports partial relief

## 2024-03-14 NOTE — DISCUSSION/SUMMARY
[de-identified] : (Assessment and Plan)   History, clinical examination and imaging were most consistent with: -left knee osteoarthritis   The diagnosis was explained in detail. The potential non-surgical and surgical treatments were reviewed. The relative risks and benefits of each option were considered relative to the patients age, activity level, medical history, symptom severity and previously attempted treatments.   The patient was advised to consult with their primary medical provider prior to initiation of any new medications to reduce the risk of adverse effects specific to their long-term home medications and medical history. The risk of gastrointestinal irritation and kidney injury specific to long-term NSAID use was discussed.     -Hyaluronic acid injection will be performed for symptom relief. The patient has severe degenerative joint disease that has not improved with multiple non-surgical modalities including cortisone injection, rest and oral pain medication. -Over the counter acetaminophen as needed for pain. -We discussed that he does not have meniscal tear on MRI and that arthroscopic debridement would only be considered as a last resort treatment if symptoms persisted after injections. Ultimately, he may require knee replacement to definitively address his arthritic changes.  -Follow up in 1 week for next injection.      (MDM)   Problem Complexity -Moderate: chronic illness with exacerbation   Risk -Moderate: injection   (Procedure: Hyaluronic Acid Injection)   Injection location was the: -left knee joint   Injection contents were: -Euflexxa   Procedure Details: -Informed consent was obtained. Discussed possible risks of hyaluronic acid injection including infection and local inflammatory reaction. -Discussed that due to infection risk, an interval between injection and any future surgery is 6 weeks for arthroscopy and 3 months for arthroplasty. -Injection performed using aseptic technique. Hemostasis was confirmed. -Procedure was tolerated well with no complications.

## 2024-03-18 ENCOUNTER — TRANSCRIPTION ENCOUNTER (OUTPATIENT)
Age: 64
End: 2024-03-18

## 2024-03-20 LAB
HCT VFR BLD CALC: 44.6 %
HGB BLD-MCNC: 14.8 G/DL
INR PPP: 1.05 RATIO
MCHC RBC-ENTMCNC: 30.9 PG
MCHC RBC-ENTMCNC: 33.2 GM/DL
MCV RBC AUTO: 93.1 FL
PLATELET # BLD AUTO: 315 K/UL
PT BLD: 11.8 SEC
RBC # BLD: 4.79 M/UL
RBC # FLD: 12.6 %
WBC # FLD AUTO: 4.93 K/UL

## 2024-03-21 ENCOUNTER — APPOINTMENT (OUTPATIENT)
Dept: ORTHOPEDIC SURGERY | Facility: CLINIC | Age: 64
End: 2024-03-21
Payer: MEDICARE

## 2024-03-21 LAB
AFP-TM SERPL-MCNC: 1.9 NG/ML
ALBUMIN SERPL ELPH-MCNC: 4.7 G/DL
ALP BLD-CCNC: 100 U/L
ALT SERPL-CCNC: 14 U/L
ANION GAP SERPL CALC-SCNC: 13 MMOL/L
AST SERPL-CCNC: 17 U/L
BILIRUB SERPL-MCNC: 0.4 MG/DL
BUN SERPL-MCNC: 13 MG/DL
CALCIUM SERPL-MCNC: 9.7 MG/DL
CANCER AG19-9 SERPL-ACNC: 13 U/ML
CEA SERPL-MCNC: 1 NG/ML
CHLORIDE SERPL-SCNC: 103 MMOL/L
CO2 SERPL-SCNC: 23 MMOL/L
CREAT SERPL-MCNC: 0.88 MG/DL
EGFR: 95 ML/MIN/1.73M2
GLUCOSE SERPL-MCNC: 114 MG/DL
POTASSIUM SERPL-SCNC: 4.6 MMOL/L
PROT SERPL-MCNC: 7.6 G/DL
SODIUM SERPL-SCNC: 139 MMOL/L

## 2024-03-21 PROCEDURE — 20610 DRAIN/INJ JOINT/BURSA W/O US: CPT | Mod: LT

## 2024-03-21 NOTE — DATA REVIEWED
[Left] : left [MRI] : MRI [Report was reviewed and noted in the chart] : The report was reviewed and noted in the chart [Knee] : knee [I reviewed the films/CD and agree] : I reviewed the films/CD and agree [FreeTextEntry1] : intact medial and lateral meniscus with degenerative fraying, mild PF and medial compartment chondromalacia

## 2024-03-21 NOTE — HISTORY OF PRESENT ILLNESS
[de-identified] : 3/21/24: Follow up left knee. Patient presents today for Euflexxa injection #2. mild relief from first injection.   03/14/24: Follow up injection for the LT knee. CSI last visit with releif for a short while. would like to discuss additional injection options.   11/16/23: pt presents for FUV of the left knee s/p gel injection series. He reports it relieved symptoms for about 2 months and then returned. He is interested in another injection today  Body part causing symptoms is the left knee Pain score at rest is  1 Pain score during activity is 4 Here today to continue Euflexxa injections, today in #3  Reports partial relief

## 2024-03-21 NOTE — DISCUSSION/SUMMARY
[de-identified] : (Assessment and Plan)   History, clinical examination and imaging were most consistent with: -left knee osteoarthritis   The diagnosis was explained in detail. The potential non-surgical and surgical treatments were reviewed. The relative risks and benefits of each option were considered relative to the patients age, activity level, medical history, symptom severity and previously attempted treatments.   The patient was advised to consult with their primary medical provider prior to initiation of any new medications to reduce the risk of adverse effects specific to their long-term home medications and medical history. The risk of gastrointestinal irritation and kidney injury specific to long-term NSAID use was discussed.     -Hyaluronic acid injection will be performed for symptom relief. The patient has severe degenerative joint disease that has not improved with multiple non-surgical modalities including cortisone injection, rest and oral pain medication. -Over the counter acetaminophen as needed for pain. -We discussed that he does not have meniscal tear on MRI and that arthroscopic debridement would only be considered as a last resort treatment if symptoms persisted after injections. Ultimately, he may require knee replacement to definitively address his arthritic changes.  -Follow up in 1 week for final injection.      (MDM)   Problem Complexity -Moderate: chronic illness with exacerbation   Risk -Moderate: injection   (Procedure: Hyaluronic Acid Injection)   Injection location was the: -left knee joint   Injection contents were: -Euflexxa   Procedure Details: -Informed consent was obtained. Discussed possible risks of hyaluronic acid injection including infection and local inflammatory reaction. -Discussed that due to infection risk, an interval between injection and any future surgery is 6 weeks for arthroscopy and 3 months for arthroplasty. -Injection performed using aseptic technique. Hemostasis was confirmed. -Procedure was tolerated well with no complications.

## 2024-03-21 NOTE — IMAGING
[de-identified] : (Physical Exam: Knee)\par  \par  Laterality is left\par  \par  Patient is in no acute distress, alert and oriented\par  Sensation is grossly intact to light touch in the foot\par  Motor function is 5/5 in the foot\par  Capillary refill is less than 2 seconds in the toes\par  Lymphadenopathy is not present\par  Peripheral edema is not present\par  \par  Skin is intact \par  Effusion is not present \par  Atrophy is not present\par  Antalgic gait is not present \par  \par  Medial joint line tenderness is not present \par  Lateral joint line tenderness is present \par  Patellar tenderness is present \par  Calf tenderness is not present \par  \par  Knee extension is 0\par  Knee flexion is 125\par  \par  Quadriceps strength is 5/5\par  Hamstring strength is 5/5\par  \par  Lachman is normal \par  Posterior drawer is normal\par  Varus stress stability is normal\par  Valgus stress stability is normal\par  \par  Medial Lacho test is normal\par  Lateral Lacho test is abnormal\par  Patellofemoral grind test is abnormal\par  Patellar apprehension test is normal\par

## 2024-03-26 ENCOUNTER — TRANSCRIPTION ENCOUNTER (OUTPATIENT)
Age: 64
End: 2024-03-26

## 2024-03-27 ENCOUNTER — APPOINTMENT (OUTPATIENT)
Dept: GASTROENTEROLOGY | Facility: HOSPITAL | Age: 64
End: 2024-03-27

## 2024-03-27 ENCOUNTER — OUTPATIENT (OUTPATIENT)
Dept: OUTPATIENT SERVICES | Facility: HOSPITAL | Age: 64
LOS: 1 days | End: 2024-03-27
Payer: MEDICARE

## 2024-03-27 ENCOUNTER — RESULT REVIEW (OUTPATIENT)
Age: 64
End: 2024-03-27

## 2024-03-27 DIAGNOSIS — R93.5 ABNORMAL FINDINGS ON DIAGNOSTIC IMAGING OF OTHER ABDOMINAL REGIONS, INCLUDING RETROPERITONEUM: ICD-10-CM

## 2024-03-27 PROCEDURE — 43238 EGD US FINE NEEDLE BX/ASPIR: CPT

## 2024-03-27 PROCEDURE — 88173 CYTOPATH EVAL FNA REPORT: CPT

## 2024-03-27 PROCEDURE — 88305 TISSUE EXAM BY PATHOLOGIST: CPT

## 2024-03-27 PROCEDURE — 88342 IMHCHEM/IMCYTCHM 1ST ANTB: CPT | Mod: 26

## 2024-03-27 PROCEDURE — 88342 IMHCHEM/IMCYTCHM 1ST ANTB: CPT | Mod: 26,59

## 2024-03-27 PROCEDURE — 88342 IMHCHEM/IMCYTCHM 1ST ANTB: CPT

## 2024-03-27 PROCEDURE — 88341 IMHCHEM/IMCYTCHM EA ADD ANTB: CPT | Mod: 26

## 2024-03-27 PROCEDURE — 43242 EGD US FINE NEEDLE BX/ASPIR: CPT

## 2024-03-27 PROCEDURE — 88305 TISSUE EXAM BY PATHOLOGIST: CPT | Mod: 26,59

## 2024-03-27 PROCEDURE — 88305 TISSUE EXAM BY PATHOLOGIST: CPT | Mod: 26

## 2024-03-27 PROCEDURE — 43239 EGD BIOPSY SINGLE/MULTIPLE: CPT | Mod: XS

## 2024-03-27 PROCEDURE — 43239 EGD BIOPSY SINGLE/MULTIPLE: CPT | Mod: 59

## 2024-03-27 PROCEDURE — 88173 CYTOPATH EVAL FNA REPORT: CPT | Mod: 26

## 2024-03-27 PROCEDURE — 88341 IMHCHEM/IMCYTCHM EA ADD ANTB: CPT

## 2024-03-28 ENCOUNTER — APPOINTMENT (OUTPATIENT)
Dept: ORTHOPEDIC SURGERY | Facility: CLINIC | Age: 64
End: 2024-03-28
Payer: MEDICARE

## 2024-03-28 DIAGNOSIS — S83.242A OTHER TEAR OF MEDIAL MENISCUS, CURRENT INJURY, LEFT KNEE, INITIAL ENCOUNTER: ICD-10-CM

## 2024-03-28 DIAGNOSIS — M17.12 UNILATERAL PRIMARY OSTEOARTHRITIS, LEFT KNEE: ICD-10-CM

## 2024-03-28 PROCEDURE — 20610 DRAIN/INJ JOINT/BURSA W/O US: CPT | Mod: LT

## 2024-03-28 NOTE — DISCUSSION/SUMMARY
[de-identified] : (Assessment and Plan)   History, clinical examination and imaging were most consistent with: -left knee osteoarthritis   The diagnosis was explained in detail. The potential non-surgical and surgical treatments were reviewed. The relative risks and benefits of each option were considered relative to the patients age, activity level, medical history, symptom severity and previously attempted treatments.   The patient was advised to consult with their primary medical provider prior to initiation of any new medications to reduce the risk of adverse effects specific to their long-term home medications and medical history. The risk of gastrointestinal irritation and kidney injury specific to long-term NSAID use was discussed.     -Hyaluronic acid injection will be performed for symptom relief. The patient has severe degenerative joint disease that has not improved with multiple non-surgical modalities including cortisone injection, rest and oral pain medication. -Over the counter acetaminophen as needed for pain. -We discussed that he does not have meniscal tear on MRI and that arthroscopic debridement would only be considered as a last resort treatment if symptoms persisted after injections. Ultimately, he may require knee replacement to definitively address his arthritic changes.  -Follow up in 3 months, consider repeat CSI vs surgical options.      (MDM)   Problem Complexity -Moderate: chronic illness with exacerbation   Risk -Moderate: injection   (Procedure: Hyaluronic Acid Injection)   Injection location was the: -left knee joint   Injection contents were: -Euflexxa   Procedure Details: -Informed consent was obtained. Discussed possible risks of hyaluronic acid injection including infection and local inflammatory reaction. -Discussed that due to infection risk, an interval between injection and any future surgery is 6 weeks for arthroscopy and 3 months for arthroplasty. -Injection performed using aseptic technique. Hemostasis was confirmed. -Procedure was tolerated well with no complications.

## 2024-03-28 NOTE — IMAGING
[de-identified] : (Physical Exam: Knee)\par  \par  Laterality is left\par  \par  Patient is in no acute distress, alert and oriented\par  Sensation is grossly intact to light touch in the foot\par  Motor function is 5/5 in the foot\par  Capillary refill is less than 2 seconds in the toes\par  Lymphadenopathy is not present\par  Peripheral edema is not present\par  \par  Skin is intact \par  Effusion is not present \par  Atrophy is not present\par  Antalgic gait is not present \par  \par  Medial joint line tenderness is not present \par  Lateral joint line tenderness is present \par  Patellar tenderness is present \par  Calf tenderness is not present \par  \par  Knee extension is 0\par  Knee flexion is 125\par  \par  Quadriceps strength is 5/5\par  Hamstring strength is 5/5\par  \par  Lachman is normal \par  Posterior drawer is normal\par  Varus stress stability is normal\par  Valgus stress stability is normal\par  \par  Medial Lacho test is normal\par  Lateral Lacho test is abnormal\par  Patellofemoral grind test is abnormal\par  Patellar apprehension test is normal\par

## 2024-03-28 NOTE — HISTORY OF PRESENT ILLNESS
[de-identified] : 03/28/24: Follow up for the LT knee. Pt here today for Euflexxa #3 injection. feels similar to last visit  3/21/24: Follow up left knee. Patient presents today for Euflexxa injection #2. mild relief from first injection.   03/14/24: Follow up injection for the LT knee. CSI last visit with releif for a short while. would like to discuss additional injection options.   11/16/23: pt presents for FUV of the left knee s/p gel injection series. He reports it relieved symptoms for about 2 months and then returned. He is interested in another injection today  Body part causing symptoms is the left knee Pain score at rest is  1 Pain score during activity is 4 Here today to continue Euflexxa injections, today in #3  Reports partial relief

## 2024-03-29 LAB — SURGICAL PATHOLOGY STUDY: SIGNIFICANT CHANGE UP

## 2024-04-02 ENCOUNTER — NON-APPOINTMENT (OUTPATIENT)
Age: 64
End: 2024-04-02

## 2024-04-02 LAB — NON-GYNECOLOGICAL CYTOLOGY STUDY: SIGNIFICANT CHANGE UP

## 2024-04-04 ENCOUNTER — APPOINTMENT (OUTPATIENT)
Dept: SURGICAL ONCOLOGY | Facility: CLINIC | Age: 64
End: 2024-04-04
Payer: MEDICARE

## 2024-04-04 VITALS
DIASTOLIC BLOOD PRESSURE: 84 MMHG | OXYGEN SATURATION: 95 % | BODY MASS INDEX: 36.53 KG/M2 | HEIGHT: 68 IN | HEART RATE: 94 BPM | WEIGHT: 241.02 LBS | SYSTOLIC BLOOD PRESSURE: 125 MMHG

## 2024-04-04 DIAGNOSIS — C49.A0 GASTROINTESTINAL STOMACL TUMOR,UNSPECIFIED SITE: ICD-10-CM

## 2024-04-04 PROCEDURE — 99204 OFFICE O/P NEW MOD 45 MIN: CPT

## 2024-04-04 NOTE — HISTORY OF PRESENT ILLNESS
[de-identified] : Mr. 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  He explains that he was in his usual state of health when he had bronchitis in the Fall. He got a CXR, which showed bronchitis and some coronary calcifications. In March, he had a cardiac CT done, where a left upper quadrant mass was found. This led to an MRI, which showed a 4.1cm exophytic gastric mass. A subsequent EGD/biopsy showed a gastric GIST. he now presents for evaluation and management.  Of note, on the MRI, there was question of a 1.2cm right posterior hepatic lesion, possibly suspicious for a metastasis, though upon review with radiologists here at [facility], not consistent with metastasis. He is otherwise feeling well and is anxious to get this removed. He denies any GI symptoms or symptoms of bleeding. He has a history of UC, and his son had fibrolamellar HCC ( at 17 years fo age). He lives in [place].

## 2024-04-04 NOTE — REASON FOR VISIT
[Initial Consultation] : an initial consultation for [Referred By: ___] : Referred By: [unfilled] [Spouse] : spouse [FreeTextEntry2] : gastric GIST

## 2024-04-04 NOTE — RESULTS/DATA
[FreeTextEntry1] : MRI A/P 1. Exophytic 4.1 cm moderate increased T2 signal mid gastric body mass with central nonenhancing core favors gastrointestinal stromal tumor (GIST) versus other neoplasm. Sampling recommended. 2. Lateral left hepatic lobe 3.3 cm hemangioma, benign. 3. Posterior right hepatic 1.2 cm T2 hyperintense nodule with layering debris/content with questionable delayed phase enhancement. PET/CT evaluation recommended for clarification to evaluate for metastatic lesion. 4. Right kidney lower pole hemorrhagic/proteinaceous renal cysts. 5. Incidental pancreatic uncinate process 1.0 cm cystic structure. No main pancreatic ductal dilatation or suspicious features. Follow-up MR/MRCP may be obtained in one year. 6. Minimal nonspecific perisplenic fluid.   PATHOLOGY [facility] Accession Number : [id number] Patient:     SHANI SPENCER Accession:                             [id number] Collected Date/Time:                   3/27/2024 18:29 EDT Received Date/Time:                    3/27/2024 18:29 EDT Fine Needle Aspiration Report - Auth (Verified) Specimen(s) Submitted ABDOMINAL, EUS GUIDED FNA  Final Diagnosis ABDOMINAL, EUS GUIDED FNA POSITIVE FOR NEOPLASM Gastrointestinal stromal tumor. Cytology specimen and cell block consist of sheaves of bland spindle cells in a background of scant mesothelial cells and blood. Tumor cells are positive with immunostains for CD34, DOG1, caldesmon, and weakly for . Stains for desmin, SMA and S100 are negative. The immunophenotype together with the cytomorphology supports the above diagnosis.  Please see concurrent report: [id number] Slide(s) with built in immunohistochemical study control(s) and negative control associated with this case has/have been verified by the sign out pathologist.  For slide(s) without built in controls positive control slides has/have been reviewed and approved by immunohistochemistry lab  These immunohistochemical/ in-situ hybridization tests have been developed and their performance characteristics determined by [facility], Department of Pathology, Division of Immunopathology, [address] [place] [id number].  It has not been cleared or approved by the U.S. Food and Drug Administration. The FDA has determined that such clearance or approval is not necessary. This test is used for clinical purposes.  The laboratory is certified under the CLIA-88 as qualified to perform high complexity clinical testing. Screened by: Semaj Woody CT(ASCP) Verified by: ITA ORTIZ MD Pathologist (Electronic Signature)  Reported on: 04/02/24 13:49 EDT, [facility], [address] [place] [address] Phone: [phone]   Fax: [phone] Cytology processed at [facility], [address] [place] [address], [place] [address] _________________________________________________________________  Clinical Information Intra abdominal mass.  Gross Description Received: Needle rinses  in CytoLyt. 30 ml of cloudy fluid.  Prepared:  1 ThinPrep, 1 cell block

## 2024-04-04 NOTE — PHYSICAL EXAM
[Normal] : oriented to person, place and time, with appropriate affect [de-identified] : RRR [de-identified] : Easy WOB

## 2024-04-04 NOTE — ASSESSMENT
[FreeTextEntry1] : 64 y/o M with a newly diagnosed 4.3 cm gastric GIST, biopsy proven, here to discuss management. On MRI at [facility], there was note of a 1.2cm right posterior hepatic lesion possibly suspicious for metastasis, with some internal debris, however upon review of imaging with [facility], it appears more benign in nature.  I had a long discussion regarding the management of GIST with Mr. Unger. Given the size of 4cm, I have recommended partial gastrectomy to remove this tumor. Although there is question of metastasis, gastric GISTs are generally more indolent and less likely to metastasize at this size, and upon review with our radiologists here at [facility], there is less suspicion for metastasis.  I have explained that after removal of the primary tumor, we can observe this area in the liver with his surveillance scans, and if there is suggestion of an eventual metastasis (growth or change in character), we can pursue additional work up and treatment, which would involve biopsy and likely systemic therapy with imatinib. Patient understands and agrees to this plan.  - will plan for robotic assisted partial gastrectomy, possible open, possible EGD - PSTs, medical clearance - post op, will refer to Dr. Rene Cowart once path reviewed to discuss adjuvant imatinib and surveillance of GIST recurrence, questionable liver lesion.  Alex Amos MD  Assistant Professor of Surgery [employer] at [facility] Division of Surgical Oncology [facility] Phone: [phone] Fax: [phone]  I spent 60 minutes reviewing the patient's chart, labs, imaging, interviewing and examining patient, and discussing plan of care with the patient, resident/PA team, and other providers, excluding separately billable procedures and teaching time.

## 2024-04-04 NOTE — CONSULT LETTER
[Dear  ___] : Dear  [unfilled], [Consult Letter:] : I had the pleasure of evaluating your patient, [unfilled]. [Please see my note below.] : Please see my note below. [Consult Closing:] : Thank you very much for allowing me to participate in the care of this patient.  If you have any questions, please do not hesitate to contact me. [Sincerely,] : Sincerely, [FreeTextEntry3] : Alex Amos MD  Assistant Professor of Surgery [employer] at [facility] Division of Surgical Oncology [facility] Phone: [phone] Fax: [phone]

## 2024-04-12 ENCOUNTER — OUTPATIENT (OUTPATIENT)
Dept: OUTPATIENT SERVICES | Facility: HOSPITAL | Age: 64
LOS: 1 days | End: 2024-04-12
Payer: MEDICARE

## 2024-04-12 VITALS
HEIGHT: 66.5 IN | TEMPERATURE: 98 F | OXYGEN SATURATION: 97 % | RESPIRATION RATE: 20 BRPM | WEIGHT: 238.1 LBS | SYSTOLIC BLOOD PRESSURE: 122 MMHG | HEART RATE: 100 BPM | DIASTOLIC BLOOD PRESSURE: 80 MMHG

## 2024-04-12 DIAGNOSIS — Z98.890 OTHER SPECIFIED POSTPROCEDURAL STATES: Chronic | ICD-10-CM

## 2024-04-12 DIAGNOSIS — Z91.89 OTHER SPECIFIED PERSONAL RISK FACTORS, NOT ELSEWHERE CLASSIFIED: ICD-10-CM

## 2024-04-12 DIAGNOSIS — C49.A0 GASTROINTESTINAL STROMAL TUMOR, UNSPECIFIED SITE: ICD-10-CM

## 2024-04-12 DIAGNOSIS — Z13.89 ENCOUNTER FOR SCREENING FOR OTHER DISORDER: ICD-10-CM

## 2024-04-12 DIAGNOSIS — Z01.818 ENCOUNTER FOR OTHER PREPROCEDURAL EXAMINATION: ICD-10-CM

## 2024-04-12 DIAGNOSIS — Z29.9 ENCOUNTER FOR PROPHYLACTIC MEASURES, UNSPECIFIED: ICD-10-CM

## 2024-04-12 DIAGNOSIS — I10 ESSENTIAL (PRIMARY) HYPERTENSION: ICD-10-CM

## 2024-04-12 LAB
A1C WITH ESTIMATED AVERAGE GLUCOSE RESULT: 5.6 % — SIGNIFICANT CHANGE UP (ref 4–5.6)
ALBUMIN SERPL ELPH-MCNC: 4.5 G/DL — SIGNIFICANT CHANGE UP (ref 3.3–5.2)
ALP SERPL-CCNC: 84 U/L — SIGNIFICANT CHANGE UP (ref 40–120)
ALT FLD-CCNC: 14 U/L — SIGNIFICANT CHANGE UP
ANION GAP SERPL CALC-SCNC: 13 MMOL/L — SIGNIFICANT CHANGE UP (ref 5–17)
APTT BLD: 36.7 SEC — HIGH (ref 24.5–35.6)
AST SERPL-CCNC: 22 U/L — SIGNIFICANT CHANGE UP
BASOPHILS # BLD AUTO: 0.03 K/UL — SIGNIFICANT CHANGE UP (ref 0–0.2)
BASOPHILS NFR BLD AUTO: 0.4 % — SIGNIFICANT CHANGE UP (ref 0–2)
BILIRUB SERPL-MCNC: 0.6 MG/DL — SIGNIFICANT CHANGE UP (ref 0.4–2)
BLD GP AB SCN SERPL QL: SIGNIFICANT CHANGE UP
BUN SERPL-MCNC: 14.3 MG/DL — SIGNIFICANT CHANGE UP (ref 8–20)
CALCIUM SERPL-MCNC: 9.6 MG/DL — SIGNIFICANT CHANGE UP (ref 8.4–10.5)
CHLORIDE SERPL-SCNC: 102 MMOL/L — SIGNIFICANT CHANGE UP (ref 96–108)
CO2 SERPL-SCNC: 24 MMOL/L — SIGNIFICANT CHANGE UP (ref 22–29)
CREAT SERPL-MCNC: 0.77 MG/DL — SIGNIFICANT CHANGE UP (ref 0.5–1.3)
EGFR: 99 ML/MIN/1.73M2 — SIGNIFICANT CHANGE UP
EOSINOPHIL # BLD AUTO: 0.22 K/UL — SIGNIFICANT CHANGE UP (ref 0–0.5)
EOSINOPHIL NFR BLD AUTO: 3.3 % — SIGNIFICANT CHANGE UP (ref 0–6)
ESTIMATED AVERAGE GLUCOSE: 114 MG/DL — SIGNIFICANT CHANGE UP (ref 68–114)
GLUCOSE SERPL-MCNC: 119 MG/DL — HIGH (ref 70–99)
HCT VFR BLD CALC: 43.8 % — SIGNIFICANT CHANGE UP (ref 39–50)
HGB BLD-MCNC: 15.3 G/DL — SIGNIFICANT CHANGE UP (ref 13–17)
IMM GRANULOCYTES NFR BLD AUTO: 0.3 % — SIGNIFICANT CHANGE UP (ref 0–0.9)
INR BLD: 1.07 RATIO — SIGNIFICANT CHANGE UP (ref 0.85–1.18)
LYMPHOCYTES # BLD AUTO: 2.2 K/UL — SIGNIFICANT CHANGE UP (ref 1–3.3)
LYMPHOCYTES # BLD AUTO: 33 % — SIGNIFICANT CHANGE UP (ref 13–44)
MAGNESIUM SERPL-MCNC: 1.9 MG/DL — SIGNIFICANT CHANGE UP (ref 1.6–2.6)
MCHC RBC-ENTMCNC: 31.2 PG — SIGNIFICANT CHANGE UP (ref 27–34)
MCHC RBC-ENTMCNC: 34.9 GM/DL — SIGNIFICANT CHANGE UP (ref 32–36)
MCV RBC AUTO: 89.4 FL — SIGNIFICANT CHANGE UP (ref 80–100)
MONOCYTES # BLD AUTO: 0.56 K/UL — SIGNIFICANT CHANGE UP (ref 0–0.9)
MONOCYTES NFR BLD AUTO: 8.4 % — SIGNIFICANT CHANGE UP (ref 2–14)
NEUTROPHILS # BLD AUTO: 3.64 K/UL — SIGNIFICANT CHANGE UP (ref 1.8–7.4)
NEUTROPHILS NFR BLD AUTO: 54.6 % — SIGNIFICANT CHANGE UP (ref 43–77)
PHOSPHATE SERPL-MCNC: 2.5 MG/DL — SIGNIFICANT CHANGE UP (ref 2.4–4.7)
PLATELET # BLD AUTO: 329 K/UL — SIGNIFICANT CHANGE UP (ref 150–400)
POTASSIUM SERPL-MCNC: 4.3 MMOL/L — SIGNIFICANT CHANGE UP (ref 3.5–5.3)
POTASSIUM SERPL-SCNC: 4.3 MMOL/L — SIGNIFICANT CHANGE UP (ref 3.5–5.3)
PROT SERPL-MCNC: 8.3 G/DL — SIGNIFICANT CHANGE UP (ref 6.6–8.7)
PROTHROM AB SERPL-ACNC: 11.9 SEC — SIGNIFICANT CHANGE UP (ref 9.5–13)
RBC # BLD: 4.9 M/UL — SIGNIFICANT CHANGE UP (ref 4.2–5.8)
RBC # FLD: 12.4 % — SIGNIFICANT CHANGE UP (ref 10.3–14.5)
SODIUM SERPL-SCNC: 139 MMOL/L — SIGNIFICANT CHANGE UP (ref 135–145)
T3 SERPL-MCNC: 116 NG/DL — SIGNIFICANT CHANGE UP (ref 80–200)
T4 AB SER-ACNC: 6.1 UG/DL — SIGNIFICANT CHANGE UP (ref 4.5–12)
TSH SERPL-MCNC: 1.05 UIU/ML — SIGNIFICANT CHANGE UP (ref 0.27–4.2)
WBC # BLD: 6.67 K/UL — SIGNIFICANT CHANGE UP (ref 3.8–10.5)
WBC # FLD AUTO: 6.67 K/UL — SIGNIFICANT CHANGE UP (ref 3.8–10.5)

## 2024-04-12 PROCEDURE — G0463: CPT

## 2024-04-12 PROCEDURE — 93010 ELECTROCARDIOGRAM REPORT: CPT

## 2024-04-12 PROCEDURE — 93005 ELECTROCARDIOGRAM TRACING: CPT

## 2024-04-12 RX ORDER — SODIUM CHLORIDE 9 MG/ML
3 INJECTION INTRAMUSCULAR; INTRAVENOUS; SUBCUTANEOUS EVERY 8 HOURS
Refills: 0 | Status: DISCONTINUED | OUTPATIENT
Start: 2024-04-17 | End: 2024-04-18

## 2024-04-12 NOTE — H&P PST ADULT - PROBLEM/PLAN-2
13y male with history of chronic lower back pain, radicular pain radiating to lower extremities, bilateral spondylolysis, and asthma; admitted on 5/4, now POD#1 L5-S1 posterior lumbar interbody fusion (PLIF) with laminectomy w/ iliac crest bone graft.
DISPLAY PLAN FREE TEXT

## 2024-04-12 NOTE — H&P PST ADULT - NSICDXFAMILYHX_GEN_ALL_CORE_FT
FAMILY HISTORY:  Child  Still living? No  FHx: liver cancer, Age at diagnosis: Age Unknown     FAMILY HISTORY:  Mother  Still living? Unknown  FH: colon cancer, Age at diagnosis: Age Unknown    Child  Still living? No  FHx: liver cancer, Age at diagnosis: Age Unknown

## 2024-04-12 NOTE — H&P PST ADULT - PROBLEM SELECTOR PLAN 1
Scheduled for robotic assisted partial gastrectomy, possible open, possible esophagogastroduodenoscopy on 4/17/24 with Dr. Amos pending medical clearance.   Labs and EKG performed  Written and verbal instructions provided.  Patient educated on surgical scrub, preadmission instructions, clearance and day of procedure medications, verbalizes understanding.  Patient instructed to stop vitamins/supplements/herbal medications/ASA/NSAIDS for one week prior to surgery and discuss with PMD, verbalized understanding.  Patient verbalized understanding of instructions and was given the opportunity to ask questions and have them answered.  Out patient medications reviewed and verified with patient.

## 2024-04-12 NOTE — H&P PST ADULT - HISTORY OF PRESENT ILLNESS
Patient is a 63 year old  male presenting today for PST, PMH includes  Patient with incidental finding of a stomach mass. States in March he had a cardiac CT performed, which demonstrated a left upper quadrant mass. As per chart "An MRI of the abdomen demonstrated a 4.1cm exophytic gastric mass. A subsequent EGD/biopsy showed a gastric GIST".   Now scheduled for robotic assisted partial gastrectomy, possible open, possible esophagogastroduodenoscopy on 24 with Dr. Amos pending medical clearance.    He denies any GI symptoms or symptoms of bleeding. He has a history of UC, and his son had fibrolamellar HCC ( at 17 years fo age). He lives in [place].  Patient is a 63 year old  male presenting today for PST, PMH includes  Patient with incidental finding of a stomach mass. States in 2024 he had a cardiac CT performed, which demonstrated a left upper quadrant mass. As per chart "An MRI of the abdomen demonstrated a 4.1cm exophytic gastric mass. A subsequent EGD/biopsy showed a gastric GIST".   Now scheduled for robotic assisted partial gastrectomy, possible open, possible esophagogastroduodenoscopy on 24 with Dr. Amos pending medical clearance.    He denies any GI symptoms or symptoms of bleeding. He has a history of UC, and his son had fibrolamellar HCC ( at 17 years fo age). He lives in [place].  Patient is a 63 year old  male presenting today for PST, PMH includes hyperparathyroidism s/p parathyroidectomy, HTN, high cholesterol, and IBS. Patient with incidental finding of a stomach mass. States in 2024 he had a cardiac CT performed, which demonstrated a left upper quadrant mass. As per chart "An MRI of the abdomen demonstrated a 4.1cm exophytic gastric mass. A subsequent EGD/biopsy showed a gastric GIST". Denies, nausea, vomiting, diarrhea, constipation, abdominal pain, bloating or changes in appetite. Denies fatigue or unexplained weight loss/gain. Reports family history of colon cancer (mother) and fibrolamellar HCC ( son  at age 17) He is now scheduled for robotic assisted partial gastrectomy, possible open, possible esophagogastroduodenoscopy on 24 with Dr. Amos pending medical clearance.

## 2024-04-12 NOTE — H&P PST ADULT - NSICDXPASTSURGICALHX_GEN_ALL_CORE_FT
PAST SURGICAL HISTORY:  H/O left knee surgery     H/O parathyroidectomy      PAST SURGICAL HISTORY:  H/O left knee surgery     H/O ligation of vein     H/O parathyroidectomy

## 2024-04-12 NOTE — H&P PST ADULT - NSICDXPASTMEDICALHX_GEN_ALL_CORE_FT
PAST MEDICAL HISTORY:  H/O hyperparathyroidism     History of colitis     History of high cholesterol     History of IBS     HTN (hypertension)      PAST MEDICAL HISTORY:  H/O hyperparathyroidism     H/O varicose veins     History of colitis     History of high cholesterol     History of IBS     HTN (hypertension)

## 2024-04-12 NOTE — H&P PST ADULT - ASSESSMENT
This is a       CAPRINI SCORE    AGE RELATED RISK FACTORS                                                             [ ] Age 41-60 years                                            (1 Point)  [ ] Age: 61-74 years                                           (2 Points)                 [ ] Age= 75 years                                                (3 Points)             DISEASE RELATED RISK FACTORS                                                       [ ] Edema in the lower extremities                 (1 Point)                     [ ] Varicose veins                                               (1 Point)                                 [ ] BMI > 25 Kg/m2                                            (1 Point)                                  [ ] Serious infection (ie PNA)                            (1 Point)                     [ ] Lung disease ( COPD, Emphysema)            (1 Point)                                                                          [ ] Acute myocardial infarction                         (1 Point)                  [ ] Congestive heart failure (in the previous month)  (1 Point)         [ ] Inflammatory bowel disease                            (1 Point)                  [ ] Central venous access, PICC or Port               (2 points)       (within the last month)                                                                [ ] Stroke (in the previous month)                        (5 Points)    [ ] Previous or present malignancy                       (2 points)                                                                                                                                                         HEMATOLOGY RELATED FACTORS                                                         [ ] Prior episodes of VTE                                     (3 Points)                     [ ] Positive family history for VTE                      (3 Points)                  [ ] Prothrombin 22874 A                                     (3 Points)                     [ ] Factor V Leiden                                                (3 Points)                        [ ] Lupus anticoagulants                                      (3 Points)                                                           [ ] Anticardiolipin antibodies                              (3 Points)                                                       [ ] High homocysteine in the blood                   (3 Points)                                             [ ] Other congenital or acquired thrombophilia      (3 Points)                                                [ ] Heparin induced thrombocytopenia                  (3 Points)                                        MOBILITY RELATED FACTORS  [ ] Bed rest                                                         (1 Point)  [ ] Plaster cast                                                    (2 points)  [ ] Bed bound for more than 72 hours           (2 Points)    GENDER SPECIFIC FACTORS  [ ] Pregnancy or had a baby within the last month   (1 Point)  [ ] Post-partum < 6 weeks                                   (1 Point)  [ ] Hormonal therapy  or oral contraception   (1 Point)  [ ] History of pregnancy complications              (1 point)  [ ] Unexplained or recurrent              (1 Point)    OTHER RISK FACTORS                                           (1 Point)  [ ] BMI >40, smoking, diabetes requiring insulin, chemotherapy  blood transfusions and length of surgery over 2 hours    SURGERY RELATED RISK FACTORS  [ ]  Section within the last month     (1 Point)  [ ] Minor surgery                                                  (1 Point)  [ ] Arthroscopic surgery                                       (2 Points)  [ ] Planned major surgery lasting more            (2 Points)      than 45 minutes     [ ] Elective hip or knee joint replacement       (5 points)       surgery                                                TRAUMA RELATED RISK FACTORS  [ ] Fracture of the hip, pelvis, or leg                       (5 Points)  [ ] Spinal cord injury resulting in paralysis             (5 points)       (in the previous month)    [ ] Paralysis  (less than 1 month)                             (5 Points)  [ ] Multiple Trauma within 1 month                        (5 Points)    Total Score [        ]    Caprini Score 0-2: Low Risk, NO VTE prophylaxis required for most patients, encourage ambulation  Caprini Score 3-6: Moderate Risk , pharmacologic VTE prophylaxis is indicated for most patients (in the absence of contraindications)  Caprini Score Greater than or =7: High risk, pharmocologic VTE prophylaxis indicated for most patients (in the absence of contraindications)        OPIOID RISK TOOL    ELIEL EACH BOX THAT APPLIES AND ADD TOTALS AT THE END    FAMILY HISTORY OF SUBSTANCE ABUSE                 FEMALE         MALE                                                Alcohol                             [  ]1 pt          [  ]3pts                                               Illegal Durgs                     [  ]2 pts        [  ]3pts                                               Rx Drugs                           [  ]4 pts        [  ]4 pts    PERSONAL HISTORY OF SUBSTANCE ABUSE                                                                                          Alcohol                             [  ]3 pts       [  ]3 pts                                               Illegal Drugs                     [  ]4 pts        [  ]4 pts                                               Rx Drugs                           [  ]5 pts        [  ]5 pts    AGE BETWEEN 16-45 YEARS                                      [  ]1 pt         [  ]1 pt    HISTORY OF PREADOLESCENT   SEXUAL ABUSE                                                             [  ]3 pts        [  ]0pts    PSYCHOLOGICAL DISEASE                     ADD, OCD, Bipolar, Schizophrenia        [  ]2 pts         [  ]2 pts                      Depression                                               [  ]1 pt           [  ]1 pt           SCORING TOTAL   (add numbers and type here)              (***)                                     A score of 3 or lower indicated LOW risk for future opioid abuse  A score of 4 to 7 indicated moderate risk for future opioid abuse  A score of 8 or higher indicates a high risk for opioid abuse                           This is a pleasant 63 year old  male in NAD presenting today for PST, PMH includes hyperparathyroidism s/p parathyroidectomy, HTN, high cholesterol, and IBS. Patient with incidental finding of a stomach mass. States in 2024 he had a cardiac CT performed, which demonstrated a left upper quadrant mass. As per chart "An MRI of the abdomen demonstrated a 4.1cm exophytic gastric mass. A subsequent EGD/biopsy showed a gastric GIST". Denies, nausea, vomiting, diarrhea, constipation, abdominal pain, bloating or changes in appetite. Denies fatigue or unexplained weight loss/gain. Reports family history of colon cancer (mother) and fibrolamellar HCC ( son  at age 17) He is now scheduled for robotic assisted partial gastrectomy, possible open, possible esophagogastroduodenoscopy on 24 with Dr. Amos pending medical clearance.     CAPRINI SCORE    AGE RELATED RISK FACTORS                                                             [ ] Age 41-60 years                                            (1 Point)  [X ] Age: 61-74 years                                           (2 Points)                 [ ] Age= 75 years                                                (3 Points)             DISEASE RELATED RISK FACTORS                                                       [ ] Edema in the lower extremities                 (1 Point)                     [X ] Varicose veins                                               (1 Point)                                 [X ] BMI > 25 Kg/m2                                            (1 Point)                                  [ ] Serious infection (ie PNA)                            (1 Point)                     [ ] Lung disease ( COPD, Emphysema)            (1 Point)                                                                          [ ] Acute myocardial infarction                         (1 Point)                  [ ] Congestive heart failure (in the previous month)  (1 Point)         [X ] Inflammatory bowel disease                            (1 Point)                  [ ] Central venous access, PICC or Port               (2 points)       (within the last month)                                                                [ ] Stroke (in the previous month)                        (5 Points)    [ X] Previous or present malignancy                       (2 points)                                                                                                                                                         HEMATOLOGY RELATED FACTORS                                                         [ ] Prior episodes of VTE                                     (3 Points)                     [ ] Positive family history for VTE                      (3 Points)                  [ ] Prothrombin 94854 A                                     (3 Points)                     [ ] Factor V Leiden                                                (3 Points)                        [ ] Lupus anticoagulants                                      (3 Points)                                                           [ ] Anticardiolipin antibodies                              (3 Points)                                                       [ ] High homocysteine in the blood                   (3 Points)                                             [ ] Other congenital or acquired thrombophilia      (3 Points)                                                [ ] Heparin induced thrombocytopenia                  (3 Points)                                        MOBILITY RELATED FACTORS  [ ] Bed rest                                                         (1 Point)  [ ] Plaster cast                                                    (2 points)  [ ] Bed bound for more than 72 hours           (2 Points)    GENDER SPECIFIC FACTORS  [ ] Pregnancy or had a baby within the last month   (1 Point)  [ ] Post-partum < 6 weeks                                   (1 Point)  [ ] Hormonal therapy  or oral contraception   (1 Point)  [ ] History of pregnancy complications              (1 point)  [ ] Unexplained or recurrent              (1 Point)    OTHER RISK FACTORS                                           (1 Point)  [X ] BMI >40, smoking, diabetes requiring insulin, chemotherapy  blood transfusions and length of surgery over 2 hours    SURGERY RELATED RISK FACTORS  [ ]  Section within the last month     (1 Point)  [ ] Minor surgery                                                  (1 Point)  [ ] Arthroscopic surgery                                       (2 Points)  [X ] Planned major surgery lasting more            (2 Points)      than 45 minutes     [ ] Elective hip or knee joint replacement       (5 points)       surgery                                                TRAUMA RELATED RISK FACTORS  [ ] Fracture of the hip, pelvis, or leg                       (5 Points)  [ ] Spinal cord injury resulting in paralysis             (5 points)       (in the previous month)    [ ] Paralysis  (less than 1 month)                             (5 Points)  [ ] Multiple Trauma within 1 month                        (5 Points)    Total Score [    10    ]    Caprini Score 0-2: Low Risk, NO VTE prophylaxis required for most patients, encourage ambulation  Caprini Score 3-6: Moderate Risk , pharmacologic VTE prophylaxis is indicated for most patients (in the absence of contraindications)  Caprini Score Greater than or =7: High risk, pharmocologic VTE prophylaxis indicated for most patients (in the absence of contraindications)        OPIOID RISK TOOL    ELIEL EACH BOX THAT APPLIES AND ADD TOTALS AT THE END    FAMILY HISTORY OF SUBSTANCE ABUSE                 FEMALE         MALE                                                Alcohol                             [  ]1 pt          [  ]3pts                                               Illegal Durgs                     [  ]2 pts        [  ]3pts                                               Rx Drugs                           [  ]4 pts        [  ]4 pts    PERSONAL HISTORY OF SUBSTANCE ABUSE                                                                                          Alcohol                             [  ]3 pts       [  ]3 pts                                               Illegal Drugs                     [  ]4 pts        [  ]4 pts                                               Rx Drugs                           [  ]5 pts        [  ]5 pts    AGE BETWEEN 16-45 YEARS                                      [  ]1 pt         [  ]1 pt    HISTORY OF PREADOLESCENT   SEXUAL ABUSE                                                             [  ]3 pts        [  ]0pts    PSYCHOLOGICAL DISEASE                     ADD, OCD, Bipolar, Schizophrenia        [  ]2 pts         [  ]2 pts                      Depression                                               [  ]1 pt           [  ]1 pt           SCORING TOTAL   (add numbers and type here)              (**0*)                                     A score of 3 or lower indicated LOW risk for future opioid abuse  A score of 4 to 7 indicated moderate risk for future opioid abuse  A score of 8 or higher indicates a high risk for opioid abuse

## 2024-04-12 NOTE — H&P PST ADULT - PROBLEM SELECTOR PLAN 2
BP today 122/80  Continue medication.   EKG performed.  Patient instructed to Valsartan with a sip of water DOS, verbalized understanding.   Medical clearance pending.

## 2024-04-12 NOTE — H&P PST ADULT - NSANTHOBSERVEDRD_ENT_A_CORE
CLINICAL NUTRITION SERVICES - ASSESSMENT NOTE     Nutrition Prescription    RECOMMENDATIONS FOR MDs/PROVIDERS TO ORDER:  None at this time     Malnutrition Status:    Patient does not meet two of the established criteria necessary for diagnosing malnutrition but is at risk for malnutrition    Recommendations already ordered by Registered Dietitian (RD):  - Ensure daily, magic cup BID   -Small, frequent eating times   - D/w pt's family regarding frequent encouragement and assistance with meals and supplements     Future/Additional Recommendations:  Monitor adequacy of PO intake. If documentation indicates that pt is consuming <50% nutritionally adequate meals TID or the equivalent with snacks/supplements, recommend:    Provide additional nutrition education with patient's family on strategies to increase PO intake    Adjust supplement and/or scheduled snacks per pt preference    Calorie Counts to assess PO intake adequacy    Consider multivitamin with minerals     Monitor nutrition-related findings and follow pt per protocol     REASON FOR ASSESSMENT  Catalino Coronado is a/an 70 year old male assessed by the dietitian for Admission Nutrition Risk Screen for unsure wt loss     CLINICAL HISTORY  Has a past medical history including Alzheimer's disease, dementia, generalized weakness with frequent falls stage IIIa kidney disease, BPH, hypertension, HLD. Patients wife is his primary caregiver.     NUTRITION HISTORY  Patient unable to provide history, pt's daughter at bedside provides nutr hx noting that patient was eating about 1/2 typical portions 1 week PTA, and since admission has been eating at best 25% of typical intake.     CURRENT NUTRITION ORDERS  Diet: Level 4: Pureed Dysphagia Diet   SLP 11/24: Recommend pt initiate pureed diet (4) and thin liquids (0) with 1:1 supervision.  Intake/Tolerance: Poor     GI    Abdomen noted to be rounded     Last BM 11/24     LABS    Reviewed     Noting trace ketones in urine on  "11/12/2022 (none noted at this admission)     MEDICATIONS    Namenda, GI s/e common     LR IVF 75 ml/h    SKIN    No pressure injuries noted at this time      ANTHROPOMETRICS  Height: 0 cm (Data Unavailable) 190.5 cm (6'3\")   Most Recent Weight:      IBW: 89.1 kg kg  107%IBW   BMI: Overweight BMI 25-29.9    Weight History:   Weight is up - no weight loss noted per most recent weight on 11/2/22  Wt Readings from Last 15 Encounters:   11/02/22 95.9 kg (211 lb 6.4 oz)   08/31/22 95.3 kg (210 lb)   07/26/22 95.3 kg (210 lb)   07/22/22 97.3 kg (214 lb 9.6 oz)   03/02/22 88.9 kg (196 lb)   01/21/22 90.6 kg (199 lb 12.8 oz)   12/22/21 91.4 kg (201 lb 9.6 oz)   12/15/21 90.3 kg (199 lb)   12/01/21 82.6 kg (182 lb)   09/24/21 93.6 kg (206 lb 6.4 oz)   05/19/21 93.4 kg (206 lb)   05/19/21 93.4 kg (206 lb)   12/31/20 91.6 kg (202 lb)   09/01/20 95.7 kg (211 lb)   02/11/20 97.1 kg (214 lb)       Dosing Weight: 95.9 kg, most recent weight measurement (11/2/22)     ASSESSED NUTRITION NEEDS  Estimated Energy Needs: 2397 - 2877 kcals/day (25 - 30 kcals/kg)  Justification: Maintenance  Estimated Protein Needs: 95 - 115 grams protein/day (1 - 1.2 grams of pro/kg)  Justification: Maintenance  Estimated Fluid Needs: 2397 - 2877 mL/day (1 mL/kcal)   Justification: Maintenance    PHYSICAL FINDINGS  See malnutrition section below.    MALNUTRITION  % Intake: </= 50% for >/= 5 days (severe)  % Weight Loss: None noted  Subcutaneous Fat Loss: None observed  Muscle Loss: None observed  Fluid Accumulation/Edema: None noted  Malnutrition Diagnosis: Patient does not meet two of the established criteria necessary for diagnosing malnutrition but is at risk for malnutrition    NUTRITION DIAGNOSIS  Inadequate protein-energy intake related to recent injury, UTI pain as evidenced by reduction in PO intake by at least 50% per family report       INTERVENTIONS  Implementation  Nutrition education for recommended modifications - small, frequent eating " times with encouragement    Medical food supplement therapy     Goals  Patient to consume % of nutritionally adequate meal trays TID, or the equivalent with supplements/snacks.     Monitoring/Evaluation  Progress toward goals will be monitored and evaluated per protocol.    Mere Herrera, MPH, RDN, LD  6D RD pager: [phone] work[address] phone: [phone]    Weekend/Holiday RD pager [phone]     No

## 2024-04-12 NOTE — H&P PST ADULT - ATTENDING COMMENTS
Planning robotic-assisted laparoscopic partial gastrectomy, possible EGD, possible open.  R/B/A discussed, including but not limited to pain, infection, bleeding requiring interventions, damage to surrounding structures including the spleen, stomach, intestines, or blood vessels, staple line leaks, rupture of the tumor which would require adjuvant therapy. I also explained the possibility of stroke, PE, cardiac events, and death. All questions answered, including those related to recovery, risk of positive margins, and the need to remove any stitches post operatively. Patient agrees to intervention today.

## 2024-04-12 NOTE — H&P PST ADULT - PROBLEM SELECTOR PROBLEM 5
HPI:    Patient ID: Ashish Price is a 38year old male.     HPI  Ashish Price is a 38year old male who presents for a complete physical exam.   HPI:       Wt Readings from Last 6 Encounters:  12/13/18 : 217 lb  09/15/18 : 210 lb  03/07/18 : 232 lb  08/18/1 no apparent distress  SKIN: no rashes,  HEENT: atraumatic, normocephalic,ears and throat are clear  NECK: supple,no adenopathy  LUNGS: clear to auscultation  CARDIO: RRR without murmur  GI: good BS's,no masses, HSM or tenderness  : two descended testes,n At risk for sleep apnea

## 2024-04-12 NOTE — H&P PST ADULT - PROBLEM SELECTOR PLAN 3
CAP score 10 patient High risk, SCDs ordered for day of procedure.  Surgical team to assess need for  pharmacological and mechanical measures for VTE prophylaxis

## 2024-04-16 ENCOUNTER — TRANSCRIPTION ENCOUNTER (OUTPATIENT)
Age: 64
End: 2024-04-16

## 2024-04-17 ENCOUNTER — INPATIENT (INPATIENT)
Facility: HOSPITAL | Age: 64
LOS: 0 days | Discharge: HOME | DRG: 328 | End: 2024-04-18
Attending: STUDENT IN AN ORGANIZED HEALTH CARE EDUCATION/TRAINING PROGRAM | Admitting: STUDENT IN AN ORGANIZED HEALTH CARE EDUCATION/TRAINING PROGRAM
Payer: COMMERCIAL

## 2024-04-17 ENCOUNTER — RESULT REVIEW (OUTPATIENT)
Age: 64
End: 2024-04-17

## 2024-04-17 ENCOUNTER — APPOINTMENT (OUTPATIENT)
Dept: SURGICAL ONCOLOGY | Facility: HOSPITAL | Age: 64
End: 2024-04-17

## 2024-04-17 VITALS
TEMPERATURE: 97 F | DIASTOLIC BLOOD PRESSURE: 74 MMHG | WEIGHT: 238.1 LBS | RESPIRATION RATE: 16 BRPM | HEART RATE: 96 BPM | HEIGHT: 66.5 IN | OXYGEN SATURATION: 98 % | SYSTOLIC BLOOD PRESSURE: 156 MMHG

## 2024-04-17 DIAGNOSIS — Z98.890 OTHER SPECIFIED POSTPROCEDURAL STATES: Chronic | ICD-10-CM

## 2024-04-17 DIAGNOSIS — C49.A0 GASTROINTESTINAL STROMAL TUMOR, UNSPECIFIED SITE: ICD-10-CM

## 2024-04-17 LAB
ABO RH CONFIRMATION: SIGNIFICANT CHANGE UP
ANION GAP SERPL CALC-SCNC: 11 MMOL/L — SIGNIFICANT CHANGE UP (ref 5–17)
BUN SERPL-MCNC: 13.3 MG/DL — SIGNIFICANT CHANGE UP (ref 8–20)
CALCIUM SERPL-MCNC: 8.9 MG/DL — SIGNIFICANT CHANGE UP (ref 8.4–10.5)
CHLORIDE SERPL-SCNC: 105 MMOL/L — SIGNIFICANT CHANGE UP (ref 96–108)
CO2 SERPL-SCNC: 20 MMOL/L — LOW (ref 22–29)
CREAT SERPL-MCNC: 0.83 MG/DL — SIGNIFICANT CHANGE UP (ref 0.5–1.3)
EGFR: 97 ML/MIN/1.73M2 — SIGNIFICANT CHANGE UP
GLUCOSE BLDC GLUCOMTR-MCNC: 119 MG/DL — HIGH (ref 70–99)
GLUCOSE SERPL-MCNC: 145 MG/DL — HIGH (ref 70–99)
HCT VFR BLD CALC: 36.9 % — LOW (ref 39–50)
HGB BLD-MCNC: 13 G/DL — SIGNIFICANT CHANGE UP (ref 13–17)
MAGNESIUM SERPL-MCNC: 1.6 MG/DL — SIGNIFICANT CHANGE UP (ref 1.6–2.6)
MCHC RBC-ENTMCNC: 31.9 PG — SIGNIFICANT CHANGE UP (ref 27–34)
MCHC RBC-ENTMCNC: 35.2 GM/DL — SIGNIFICANT CHANGE UP (ref 32–36)
MCV RBC AUTO: 90.4 FL — SIGNIFICANT CHANGE UP (ref 80–100)
PHOSPHATE SERPL-MCNC: 2.1 MG/DL — LOW (ref 2.4–4.7)
PLATELET # BLD AUTO: 237 K/UL — SIGNIFICANT CHANGE UP (ref 150–400)
POTASSIUM SERPL-MCNC: 4.2 MMOL/L — SIGNIFICANT CHANGE UP (ref 3.5–5.3)
POTASSIUM SERPL-SCNC: 4.2 MMOL/L — SIGNIFICANT CHANGE UP (ref 3.5–5.3)
RBC # BLD: 4.08 M/UL — LOW (ref 4.2–5.8)
RBC # FLD: 12.4 % — SIGNIFICANT CHANGE UP (ref 10.3–14.5)
SODIUM SERPL-SCNC: 136 MMOL/L — SIGNIFICANT CHANGE UP (ref 135–145)
WBC # BLD: 7.73 K/UL — SIGNIFICANT CHANGE UP (ref 3.8–10.5)
WBC # FLD AUTO: 7.73 K/UL — SIGNIFICANT CHANGE UP (ref 3.8–10.5)

## 2024-04-17 PROCEDURE — S2900 ROBOTIC SURGICAL SYSTEM: CPT | Mod: NC

## 2024-04-17 PROCEDURE — 88309 TISSUE EXAM BY PATHOLOGIST: CPT | Mod: 26

## 2024-04-17 PROCEDURE — 88341 IMHCHEM/IMCYTCHM EA ADD ANTB: CPT | Mod: 26

## 2024-04-17 PROCEDURE — 88342 IMHCHEM/IMCYTCHM 1ST ANTB: CPT | Mod: 26

## 2024-04-17 PROCEDURE — 43631 REMOVAL OF STOMACH PARTIAL: CPT

## 2024-04-17 PROCEDURE — 76998 US GUIDE INTRAOP: CPT | Mod: 26

## 2024-04-17 DEVICE — STAPLER COVIDIEN TRI-STAPLE 60MM PURPLE RELOAD: Type: IMPLANTABLE DEVICE | Status: FUNCTIONAL

## 2024-04-17 DEVICE — CLIP APPLIER COVIDIEN ENDOCLIP III 5MM: Type: IMPLANTABLE DEVICE | Status: FUNCTIONAL

## 2024-04-17 DEVICE — TISSEEL 10ML: Type: IMPLANTABLE DEVICE | Status: FUNCTIONAL

## 2024-04-17 RX ORDER — BUPIVACAINE 13.3 MG/ML
20 INJECTION, SUSPENSION, LIPOSOMAL INFILTRATION ONCE
Refills: 0 | Status: DISCONTINUED | OUTPATIENT
Start: 2024-04-17 | End: 2024-04-17

## 2024-04-17 RX ORDER — CHOLECALCIFEROL (VITAMIN D3) 125 MCG
1 CAPSULE ORAL
Refills: 0 | DISCHARGE

## 2024-04-17 RX ORDER — FENTANYL CITRATE 50 UG/ML
25 INJECTION INTRAVENOUS
Refills: 0 | Status: DISCONTINUED | OUTPATIENT
Start: 2024-04-17 | End: 2024-04-17

## 2024-04-17 RX ORDER — CEFOTETAN DISODIUM 1 G
2 VIAL (EA) INJECTION ONCE
Refills: 0 | Status: DISCONTINUED | OUTPATIENT
Start: 2024-04-17 | End: 2024-04-17

## 2024-04-17 RX ORDER — ENOXAPARIN SODIUM 100 MG/ML
40 INJECTION SUBCUTANEOUS EVERY 24 HOURS
Refills: 0 | Status: DISCONTINUED | OUTPATIENT
Start: 2024-04-17 | End: 2024-04-18

## 2024-04-17 RX ORDER — VALSARTAN 80 MG/1
1 TABLET ORAL
Refills: 0 | DISCHARGE

## 2024-04-17 RX ORDER — HEPARIN SODIUM 5000 [USP'U]/ML
5000 INJECTION INTRAVENOUS; SUBCUTANEOUS ONCE
Refills: 0 | Status: COMPLETED | OUTPATIENT
Start: 2024-04-17 | End: 2024-04-17

## 2024-04-17 RX ORDER — ATORVASTATIN CALCIUM 80 MG/1
1 TABLET, FILM COATED ORAL
Refills: 0 | DISCHARGE

## 2024-04-17 RX ORDER — AMLODIPINE BESYLATE 2.5 MG/1
5 TABLET ORAL DAILY
Refills: 0 | Status: DISCONTINUED | OUTPATIENT
Start: 2024-04-17 | End: 2024-04-17

## 2024-04-17 RX ORDER — ACETAMINOPHEN 500 MG
975 TABLET ORAL ONCE
Refills: 0 | Status: COMPLETED | OUTPATIENT
Start: 2024-04-17 | End: 2024-04-17

## 2024-04-17 RX ORDER — HYDROMORPHONE HYDROCHLORIDE 2 MG/ML
0.5 INJECTION INTRAMUSCULAR; INTRAVENOUS; SUBCUTANEOUS
Refills: 0 | Status: DISCONTINUED | OUTPATIENT
Start: 2024-04-17 | End: 2024-04-17

## 2024-04-17 RX ORDER — MESALAMINE 400 MG
2000 TABLET, DELAYED RELEASE (ENTERIC COATED) ORAL
Refills: 0 | Status: DISCONTINUED | OUTPATIENT
Start: 2024-04-17 | End: 2024-04-18

## 2024-04-17 RX ORDER — ACETAMINOPHEN 500 MG
1000 TABLET ORAL EVERY 6 HOURS
Refills: 0 | Status: DISCONTINUED | OUTPATIENT
Start: 2024-04-17 | End: 2024-04-18

## 2024-04-17 RX ORDER — SODIUM CHLORIDE 9 MG/ML
1000 INJECTION, SOLUTION INTRAVENOUS
Refills: 0 | Status: DISCONTINUED | OUTPATIENT
Start: 2024-04-17 | End: 2024-04-18

## 2024-04-17 RX ORDER — ATORVASTATIN CALCIUM 80 MG/1
20 TABLET, FILM COATED ORAL AT BEDTIME
Refills: 0 | Status: DISCONTINUED | OUTPATIENT
Start: 2024-04-17 | End: 2024-04-18

## 2024-04-17 RX ORDER — ONDANSETRON 8 MG/1
4 TABLET, FILM COATED ORAL ONCE
Refills: 0 | Status: COMPLETED | OUTPATIENT
Start: 2024-04-17 | End: 2024-04-17

## 2024-04-17 RX ORDER — VALSARTAN 80 MG/1
320 TABLET ORAL DAILY
Refills: 0 | Status: DISCONTINUED | OUTPATIENT
Start: 2024-04-17 | End: 2024-04-18

## 2024-04-17 RX ORDER — AMLODIPINE BESYLATE 2.5 MG/1
10 TABLET ORAL AT BEDTIME
Refills: 0 | Status: DISCONTINUED | OUTPATIENT
Start: 2024-04-17 | End: 2024-04-18

## 2024-04-17 RX ORDER — AMLODIPINE BESYLATE 2.5 MG/1
10 TABLET ORAL AT BEDTIME
Refills: 0 | Status: DISCONTINUED | OUTPATIENT
Start: 2024-04-17 | End: 2024-04-17

## 2024-04-17 RX ORDER — MESALAMINE 400 MG
4 TABLET, DELAYED RELEASE (ENTERIC COATED) ORAL
Refills: 0 | DISCHARGE

## 2024-04-17 RX ORDER — AMLODIPINE BESYLATE 2.5 MG/1
1 TABLET ORAL
Refills: 0 | DISCHARGE

## 2024-04-17 RX ADMIN — Medication 400 MILLIGRAM(S): at 14:13

## 2024-04-17 RX ADMIN — Medication 1000 MILLIGRAM(S): at 15:13

## 2024-04-17 RX ADMIN — HYDROMORPHONE HYDROCHLORIDE 0.5 MILLIGRAM(S): 2 INJECTION INTRAMUSCULAR; INTRAVENOUS; SUBCUTANEOUS at 11:14

## 2024-04-17 RX ADMIN — ATORVASTATIN CALCIUM 20 MILLIGRAM(S): 80 TABLET, FILM COATED ORAL at 21:10

## 2024-04-17 RX ADMIN — ENOXAPARIN SODIUM 40 MILLIGRAM(S): 100 INJECTION SUBCUTANEOUS at 21:29

## 2024-04-17 RX ADMIN — Medication 975 MILLIGRAM(S): at 06:50

## 2024-04-17 RX ADMIN — SODIUM CHLORIDE 120 MILLILITER(S): 9 INJECTION, SOLUTION INTRAVENOUS at 14:13

## 2024-04-17 RX ADMIN — HYDROMORPHONE HYDROCHLORIDE 0.5 MILLIGRAM(S): 2 INJECTION INTRAMUSCULAR; INTRAVENOUS; SUBCUTANEOUS at 10:15

## 2024-04-17 RX ADMIN — SODIUM CHLORIDE 80 MILLILITER(S): 9 INJECTION, SOLUTION INTRAVENOUS at 17:08

## 2024-04-17 RX ADMIN — ONDANSETRON 4 MILLIGRAM(S): 8 TABLET, FILM COATED ORAL at 10:30

## 2024-04-17 RX ADMIN — HYDROMORPHONE HYDROCHLORIDE 0.5 MILLIGRAM(S): 2 INJECTION INTRAMUSCULAR; INTRAVENOUS; SUBCUTANEOUS at 10:30

## 2024-04-17 RX ADMIN — HYDROMORPHONE HYDROCHLORIDE 0.5 MILLIGRAM(S): 2 INJECTION INTRAMUSCULAR; INTRAVENOUS; SUBCUTANEOUS at 10:00

## 2024-04-17 RX ADMIN — Medication 1000 MILLIGRAM(S): at 22:10

## 2024-04-17 RX ADMIN — Medication 400 MILLIGRAM(S): at 21:10

## 2024-04-17 RX ADMIN — SODIUM CHLORIDE 3 MILLILITER(S): 9 INJECTION INTRAMUSCULAR; INTRAVENOUS; SUBCUTANEOUS at 21:16

## 2024-04-17 RX ADMIN — Medication 2000 MILLIGRAM(S): at 17:09

## 2024-04-17 RX ADMIN — SODIUM CHLORIDE 3 MILLILITER(S): 9 INJECTION INTRAMUSCULAR; INTRAVENOUS; SUBCUTANEOUS at 14:29

## 2024-04-17 RX ADMIN — HEPARIN SODIUM 5000 UNIT(S): 5000 INJECTION INTRAVENOUS; SUBCUTANEOUS at 06:51

## 2024-04-17 NOTE — BRIEF OPERATIVE NOTE - NSICDXBRIEFPREOP_GEN_ALL_CORE_FT
PRE-OP DIAGNOSIS:  Gastrointestinal stromal tumor (GIST) of stomach 17-Apr-2024 09:59:28  Alvaro Vanessa

## 2024-04-17 NOTE — PATIENT PROFILE ADULT - FALL HARM RISK - HARM RISK INTERVENTIONS

## 2024-04-17 NOTE — PATIENT PROFILE ADULT - BILL PAYMENT
Return to the ED should your symptoms worsen. Followup with your primary care physician.     Nausea / Vomiting    Nausea is the feeling that you have to vomit. As nausea gets worse, it can lead to vomiting. Vomiting puts you at an increased risk for dehydration. Older adults and people with other diseases or a weak immune system are at higher risk for dehydration. Drink clear fluids in small but frequent amounts as tolerated. Eat bland, easy-to-digest foods in small amounts as tolerated.    SEEK IMMEDIATE MEDICAL CARE IF YOU HAVE ANY OF THE FOLLOWING SYMPTOMS: fever, inability to keep sufficient fluids down, black or bloody vomitus, black or bloody stools, lightheadedness/dizziness, chest pain, severe headache, rash, shortness of breath, cold or clammy skin, confusion, pain with urination, or any signs of dehydration. no

## 2024-04-17 NOTE — BRIEF OPERATIVE NOTE - OPERATION/FINDINGS
[facility]     eMERGENCY dEPARTMENT eNCOUnter         Pt Name: Lakeisha Aguilera  MRN: [id number]  [place] 2019  Date of evaluation: 8/13/2018  Provider: Savanna Ware MD    CHIEF COMPLAINT       Chief Complaint   Patient presents with    Otitis Media    Pharyngitis    Other     no wet diapers for 24 hours       Nurses Notes reviewed and I agree except as noted in the HPI. HISTORY OF PRESENT ILLNESS    Lakeisha Aguilera is a 9 m.o. female who presents For evaluation of a febrile illness sore throat and earache. The child was seen at [facility] today and was diagnosed with otitis media and started on antibiotics, Amoxil. Mother says she will keep the antibiotics down. The dad also said they were shown blisters in the throat. The child has not had a wet diaper now for 24 hours and won't eat or drink anything. This HPI was provided by the patient. REVIEW OF SYSTEMS     Review of Systems   Constitutional: Positive for appetite change. Negative for crying, fever and irritability. HENT: Positive for mouth sores. Negative for congestion and ear discharge. Eyes: Negative for discharge. Respiratory: Negative for cough and wheezing. Cardiovascular: Negative for cyanosis. Gastrointestinal: Negative for constipation, diarrhea and vomiting. Genitourinary: Positive for decreased urine volume. Musculoskeletal: Negative for joint swelling. Skin: Negative for color change and rash. Allergic/Immunologic: Negative for immunocompromised state. Neurological: Negative for seizures. All other systems reviewed and are negative. PAST MEDICAL HISTORY    has no past medical history on file. SURGICAL HISTORY      has no past surgical history on file.     CURRENT MEDICATIONS       Previous Medications    ACETAMINOPHEN (TYLENOL) 40 MG/0.4 ML INFANT DROPS    Take 15 mg/kg by mouth every 4 hours as needed for Fever    IBUPROFEN (MOTRIN) 40 MG/ML SUSP    Take 5 mg/kg by mouth every 4 hours as needed for Pain       ALLERGIES     has No Known Allergies. FAMILY HISTORY     has no family status information on file. family history is not on file. SOCIAL HISTORY      reports that she has never smoked. She has never used smokeless tobacco.    PHYSICAL EXAM       ED Triage Vitals   BP Temp Temp Source Heart Rate Resp SpO2 Height Weight - Scale   -- 08/13/18 0128 08/13/18 0126 08/13/18 0126 -- 08/13/18 0126 -- 08/13/18 0126    100.1 °F (37.8 °C) Rectal 146  100 %  16 lb (7.258 kg)      Physical Exam   Constitutional: She appears well-developed and well-nourished. She is active. She does not have a sickly appearance. She does not appear ill. HENT:   Head: Anterior fontanelle is flat. No facial anomaly. Right Ear: Tympanic membrane, external ear, pinna and canal normal.   Left Ear: Tympanic membrane, external ear, pinna and canal normal.   Nose: No nasal discharge. Mouth/Throat: Mucous membranes are moist. Oral lesions present. Pharynx erythema and pharyngeal vesicles present. No tonsillar exudate. Pharynx is abnormal.   Eyes: Pupils are equal, round, and reactive to light. Conjunctivae are normal. Right eye exhibits no discharge. Left eye exhibits no discharge. Neck: Normal range of motion. Neck supple. Cardiovascular: Regular rhythm, S1 normal and S2 normal.  Tachycardia present. No murmur heard. Pulmonary/Chest: Effort normal and breath sounds normal. No nasal flaring. No respiratory distress. She has no wheezes. She has no rhonchi. She exhibits no retraction. Abdominal: Soft. She exhibits no distension and no mass. There is no tenderness. There is no guarding. Genitourinary: No labial rash. Musculoskeletal: Normal range of motion. She exhibits no edema, deformity or signs of injury. Neurological: She is alert. She exhibits normal muscle tone. Suck normal. Symmetric [place]. Skin: Skin is warm and dry. Capillary refill takes less than 3 seconds.  Turgor is normal. No petechiae, no purpura and no rash noted. No cyanosis. No jaundice or pallor. On exposed skin surfaces. Nursing note and vitals reviewed. DIFFERENTIAL DIAGNOSIS:   Herpangina, consider hand-foot-and-mouth disease.     DIAGNOSTIC RESULTS     LABS:   Results for orders placed or performed during the hospital encounter of 08/13/18   Group A Strep, Reflex   Result Value Ref Range    GROUP A STREP CULTURE, REFLEX NEGATIVE NEGATIVE    REFLEX THROAT C + S INDICATED    Blood gas, capillary   Result Value Ref Range    pH, Cap 7.41 7.30 - 7.45    PCO2 CAPILLARY 26 (L) 40 - 55 mmhg    pO2, Cap 66 (H) 35 - 45 mmhg    HCO3, Cap 16 (L) 17 - 20 mmol/l    Base Excess, Cap -7.3 (L) -2.5 - 2.5 mmol/l    O2 Sat, Cap 93 (L) 94 - 97    Site L Heel     COLLECTED BY: [id number]     [facility] [employer] Cordia Test N/A     DEVICE Room Air    CBC auto differential   Result Value Ref Range    WBC 18.5 (H) 6.0 - 17.0 thou/mm3    RBC 4.09 (L) 4.10 - 5.30 mill/mm3    Hemoglobin 10.7 (L) 11.0 - 15.0 gm/dl    Hematocrit 30.9 (L) 35.0 - 45.0 %    MCV 75.6 75.0 - 95.0 fL    MCH 26.2 26.0 - 33.0 pg    MCHC 34.6 32.2 - 35.5 gm/dl    RDW-CV 13.7 11.5 - 14.5 %    RDW-SD 36.9 35.0 - 45.0 fL    Platelets 523 (H) 404 - 400 thou/mm3    MPV 9.2 (L) 9.4 - 12.4 fL    Seg Neutrophils 66.8 %    Lymphocytes 22.1 %    Monocytes 10.5 %    Eosinophils 0.0 %    Basophils 0.2 %    Immature Granulocytes 0.4 %    Segs Absolute 12.4 (H) 1.0 - 8.5 thou/mm3    Lymphocytes # 4.1 3.0 - 13.5 thou/mm3    Monocytes # 1.9 0.3 - 2.7 thou/mm3    Eosinophils # 0.0 0.0 - 0.4 thou/mm3    Basophils # 0.0 0.0 - 0.1 thou/mm3    Immature Grans (Abs) 0.07 0.00 - 0.07 thou/mm3    nRBC 0 /100 wbc   Basic Metabolic Panel   Result Value Ref Range    Sodium 139 135 - 145 meq/L    Potassium 4.5 3.5 - 5.2 meq/L    Chloride 99 98 - 111 meq/L    CO2 16 (L) 23 - 33 meq/L    Glucose 50 (L) 70 - 108 mg/dL    BUN 18 7 - 22 mg/dL    CREATININE 0.3 (L) 0.4 - 1.2 mg/dL    Calcium 10.1 8.5 - 10.5 mg/dL Robot assisted partial gastrectomy of exophytic GIST. 5mm clips applied to staple line and reinforced with Tisseal

## 2024-04-17 NOTE — BRIEF OPERATIVE NOTE - NSICDXBRIEFPOSTOP_GEN_ALL_CORE_FT
POST-OP DIAGNOSIS:  Gastrointestinal stromal tumor (GIST) of stomach 17-Apr-2024 10:00:35  Alvaro Vanessa

## 2024-04-18 ENCOUNTER — TRANSCRIPTION ENCOUNTER (OUTPATIENT)
Age: 64
End: 2024-04-18

## 2024-04-18 ENCOUNTER — NON-APPOINTMENT (OUTPATIENT)
Age: 64
End: 2024-04-18

## 2024-04-18 VITALS
DIASTOLIC BLOOD PRESSURE: 68 MMHG | SYSTOLIC BLOOD PRESSURE: 121 MMHG | HEART RATE: 88 BPM | RESPIRATION RATE: 18 BRPM | OXYGEN SATURATION: 95 % | TEMPERATURE: 98 F

## 2024-04-18 PROBLEM — Z87.19 PERSONAL HISTORY OF OTHER DISEASES OF THE DIGESTIVE SYSTEM: Chronic | Status: ACTIVE | Noted: 2024-04-12

## 2024-04-18 PROBLEM — Z86.39 PERSONAL HISTORY OF OTHER ENDOCRINE, NUTRITIONAL AND METABOLIC DISEASE: Chronic | Status: ACTIVE | Noted: 2024-04-12

## 2024-04-18 LAB
BASOPHILS # BLD AUTO: 0.01 K/UL — SIGNIFICANT CHANGE UP (ref 0–0.2)
BASOPHILS NFR BLD AUTO: 0.1 % — SIGNIFICANT CHANGE UP (ref 0–2)
EOSINOPHIL # BLD AUTO: 0.01 K/UL — SIGNIFICANT CHANGE UP (ref 0–0.5)
EOSINOPHIL NFR BLD AUTO: 0.1 % — SIGNIFICANT CHANGE UP (ref 0–6)
HCT VFR BLD CALC: 37.1 % — LOW (ref 39–50)
HGB BLD-MCNC: 12.9 G/DL — LOW (ref 13–17)
IMM GRANULOCYTES NFR BLD AUTO: 0.5 % — SIGNIFICANT CHANGE UP (ref 0–0.9)
LYMPHOCYTES # BLD AUTO: 1.52 K/UL — SIGNIFICANT CHANGE UP (ref 1–3.3)
LYMPHOCYTES # BLD AUTO: 14.6 % — SIGNIFICANT CHANGE UP (ref 13–44)
MCHC RBC-ENTMCNC: 31.2 PG — SIGNIFICANT CHANGE UP (ref 27–34)
MCHC RBC-ENTMCNC: 34.8 GM/DL — SIGNIFICANT CHANGE UP (ref 32–36)
MCV RBC AUTO: 89.6 FL — SIGNIFICANT CHANGE UP (ref 80–100)
MONOCYTES # BLD AUTO: 0.87 K/UL — SIGNIFICANT CHANGE UP (ref 0–0.9)
MONOCYTES NFR BLD AUTO: 8.4 % — SIGNIFICANT CHANGE UP (ref 2–14)
NEUTROPHILS # BLD AUTO: 7.95 K/UL — HIGH (ref 1.8–7.4)
NEUTROPHILS NFR BLD AUTO: 76.3 % — SIGNIFICANT CHANGE UP (ref 43–77)
PLATELET # BLD AUTO: 270 K/UL — SIGNIFICANT CHANGE UP (ref 150–400)
RBC # BLD: 4.14 M/UL — LOW (ref 4.2–5.8)
RBC # FLD: 12.4 % — SIGNIFICANT CHANGE UP (ref 10.3–14.5)
WBC # BLD: 10.41 K/UL — SIGNIFICANT CHANGE UP (ref 3.8–10.5)
WBC # FLD AUTO: 10.41 K/UL — SIGNIFICANT CHANGE UP (ref 3.8–10.5)

## 2024-04-18 PROCEDURE — 88341 IMHCHEM/IMCYTCHM EA ADD ANTB: CPT

## 2024-04-18 PROCEDURE — 83735 ASSAY OF MAGNESIUM: CPT

## 2024-04-18 PROCEDURE — 36415 COLL VENOUS BLD VENIPUNCTURE: CPT

## 2024-04-18 PROCEDURE — C1889: CPT

## 2024-04-18 PROCEDURE — 88342 IMHCHEM/IMCYTCHM 1ST ANTB: CPT

## 2024-04-18 PROCEDURE — 82962 GLUCOSE BLOOD TEST: CPT

## 2024-04-18 PROCEDURE — 85027 COMPLETE CBC AUTOMATED: CPT

## 2024-04-18 PROCEDURE — 85025 COMPLETE CBC W/AUTO DIFF WBC: CPT

## 2024-04-18 PROCEDURE — 88309 TISSUE EXAM BY PATHOLOGIST: CPT

## 2024-04-18 PROCEDURE — 84100 ASSAY OF PHOSPHORUS: CPT

## 2024-04-18 PROCEDURE — S2900: CPT

## 2024-04-18 PROCEDURE — 43775 LAP SLEEVE GASTRECTOMY: CPT

## 2024-04-18 PROCEDURE — 88360 TUMOR IMMUNOHISTOCHEM/MANUAL: CPT

## 2024-04-18 PROCEDURE — C9399: CPT

## 2024-04-18 PROCEDURE — 80048 BASIC METABOLIC PNL TOTAL CA: CPT

## 2024-04-18 RX ADMIN — VALSARTAN 320 MILLIGRAM(S): 80 TABLET ORAL at 05:40

## 2024-04-18 RX ADMIN — Medication 2000 MILLIGRAM(S): at 05:41

## 2024-04-18 RX ADMIN — Medication 400 MILLIGRAM(S): at 09:46

## 2024-04-18 RX ADMIN — SODIUM CHLORIDE 3 MILLILITER(S): 9 INJECTION INTRAMUSCULAR; INTRAVENOUS; SUBCUTANEOUS at 06:00

## 2024-04-18 NOTE — DISCHARGE NOTE PROVIDER - CARE PROVIDER_API CALL
Alex Amos  [facility] General Surgical Oncology  [address] [place] [address]  Phone: [phone]  Fax: [phone]  Follow Up Time: 2 weeks

## 2024-04-18 NOTE — DISCHARGE NOTE PROVIDER - HOSPITAL COURSE
63M admitted to hospital s/p  RA resection of exophytic GIST of stomach, patient tolerated the procedure well and was able to tolerate clear liquids the evening after surgery. Patient was advanced to a reglar diet on POD 1 which he tolerated well. Paient remained hemodynamically stable and was ambulating and voiding freely. Patient will be discharged with follow up with Dr. Amos in 2 weeks

## 2024-04-18 NOTE — DISCHARGE NOTE NURSING/CASE MANAGEMENT/SOCIAL WORK - PATIENT PORTAL LINK FT
You can access the FollowMyHealth Patient Portal offered by [facility] by registering at the following website: [email or web address]. By joining [facility]’s FollowMyHealth portal, you will also be able to view your health information using other applications (apps) compatible with our system.

## 2024-04-18 NOTE — DISCHARGE NOTE PROVIDER - EXTENDED VTE YES NO FOR MLM ENOXAPARIN
Referral Type: INTERNAL  Location: [place]  Procedure: Colonoscopy  Diagnosis: colon cancer screening  Referring Provider: Marco Antonio Merritt MD  Referral To: UNSPECIFIED  Referral Notes: none   Previous Procedure: YES EGD  · Date: 03/11/2021  · Provider: PAM CRUZ MD  · Sedation Used: MAC  · Findings: Biopsies show reflux and gastritis without Scruggs's        Referral letter sent. Routing to the scheduling pool for 2nd contact.            ,

## 2024-04-18 NOTE — PROGRESS NOTE ADULT - ASSESSMENT
63M s/p RA resection of exophytic GIST of stomach. Patient is clinically well, vitals and labs remain stable.    Plan:  -advance diet to regular this AM  -OOB/ambulate  -replete labs as necessary  -dc home today

## 2024-04-18 NOTE — DISCHARGE NOTE PROVIDER - NSDCMRMEDTOKEN_GEN_ALL_CORE_FT
atorvastatin 20 mg oral tablet: 1 tab(s) orally once a day  D3 25 mcg (1000 intl units) oral tablet: 1 tab(s) orally once a day  Multiple Vitamins oral tablet: 1 tab(s) orally once a day  Norvasc 5 mg oral tablet: 1 tab(s) orally once a day  Pentasa 500 mg oral capsule, extended release: 4 cap(s) orally 2 times a day  valsartan 320 mg oral tablet: 1 tab(s) orally once a day

## 2024-04-18 NOTE — PROGRESS NOTE ADULT - SUBJECTIVE AND OBJECTIVE BOX
Post-op Check    Subjective:  Pain well controlled on current regiment. Tolerating CLD. Denies nausea, vomiting, chest pain, SOB, palpitations.     STATUS POST:  RA resection of exophytic GIST of stomach    POST OPERATIVE DAY #: 0    MEDICATIONS  (STANDING):  amLODIPine   Tablet 10 milliGRAM(s) Oral at bedtime  atorvastatin 20 milliGRAM(s) Oral at bedtime  enoxaparin Injectable 40 milliGRAM(s) SubCutaneous every 24 hours  lactated ringers. 1000 milliLiter(s) (80 mL/Hr) IV Continuous <Continuous>  mesalamine ER Capsule 2000 milliGRAM(s) Oral two times a day  sodium chloride 0.9% lock flush 3 milliLiter(s) IV Push every 8 hours  valsartan 320 milliGRAM(s) Oral daily    MEDICATIONS  (PRN):  acetaminophen   IVPB .. 1000 milliGRAM(s) IV Intermittent every 6 hours PRN Temp greater or equal to 38C (100.4F), Mild Pain (1 - 3), Moderate Pain (4 - 6), Severe Pain (7 - 10)      Vital Signs Last 24 Hrs  T(C): 36.9 (17 Apr 2024 13:00), Max: 36.9 (17 Apr 2024 13:00)  T(F): 98.5 (17 Apr 2024 13:00), Max: 98.5 (17 Apr 2024 13:00)  HR: 80 (17 Apr 2024 13:00) (60 - 96)  BP: 108/64 (17 Apr 2024 13:00) (108/64 - 156/74)  BP(mean): 82 (17 Apr 2024 12:00) (77 - 97)  RR: 17 (17 Apr 2024 13:00) (12 - 20)  SpO2: 94% (17 Apr 2024 13:00) (94% - 98%)    Parameters below as of 17 Apr 2024 13:00  Patient On (Oxygen Delivery Method): room air        Physical Exam:    General: Laying comfortably in bed, NAD  HEENT: PERRL, EOMI  Neck: No JVD, FROM without pain  Respiratory: no accessory muscle use, respirations non-labored  Abdomen: soft, mild RUQ tenderness  Skin: Surgical sites well approximated with overlying Dermabond, no palpable hematoma.   Neurological: A&O x 3; without gross deficit    A: 63M s/p RA resection of exophytic GIST of stomach, clinically well.     P:  Pt medically able to be advanced to regular diet but being held on CLD until breakfast tomorrow at the request of the patient  Continue current care  Pain control  OOB as tolerated  Encourage IS  DVT ppx
INTERVAL HPI/OVERNIGHT EVENTS:    Patient evaluated at bedside. Now s/p RA partial gastrectomy. NAOE. Patient denies N/V/CP/SOB, no subjective fevers or chills. Pain well controlled. Tolerating CLD.    MEDICATIONS  (STANDING):  amLODIPine   Tablet 10 milliGRAM(s) Oral at bedtime  atorvastatin 20 milliGRAM(s) Oral at bedtime  enoxaparin Injectable 40 milliGRAM(s) SubCutaneous every 24 hours  lactated ringers. 1000 milliLiter(s) (80 mL/Hr) IV Continuous <Continuous>  mesalamine ER Capsule 2000 milliGRAM(s) Oral two times a day  sodium chloride 0.9% lock flush 3 milliLiter(s) IV Push every 8 hours  valsartan 320 milliGRAM(s) Oral daily    MEDICATIONS  (PRN):  acetaminophen   IVPB .. 1000 milliGRAM(s) IV Intermittent every 6 hours PRN Temp greater or equal to 38C (100.4F), Mild Pain (1 - 3), Moderate Pain (4 - 6), Severe Pain (7 - 10)      Vital Signs Last 24 Hrs  T(C): 36.6 (18 Apr 2024 04:18), Max: 36.9 (17 Apr 2024 13:00)  T(F): 97.8 (18 Apr 2024 04:18), Max: 98.5 (17 Apr 2024 13:00)  HR: 80 (18 Apr 2024 04:18) (60 - 96)  BP: 122/67 (18 Apr 2024 04:18) (105/65 - 139/71)  BP(mean): 82 (17 Apr 2024 12:00) (77 - 97)  RR: 18 (18 Apr 2024 04:18) (12 - 20)  SpO2: 97% (18 Apr 2024 04:18) (93% - 98%)    Parameters below as of 18 Apr 2024 04:18  Patient On (Oxygen Delivery Method): room air        Constitutional: NAD  HEENT: PERRL, no drainage or redness  Respiratory: respirations even, unlabored on room air  Cardiovascular: RRR  Gastrointestinal: Soft, non-distended, appropriately TTP  Neurological: A&O x 3; no gross deficits from baseline  Psychiatric: Normal mood, normal affect        I&O's Detail    17 Apr 2024 07:01  -  18 Apr 2024 07:00  --------------------------------------------------------  IN:    Lactated Ringers: 960 mL    Oral Fluid: 120 mL  Total IN: 1080 mL    OUT:    Voided (mL): 950 mL  Total OUT: 950 mL    Total NET: 130 mL          LABS:                        12.9   10.41 )-----------( 270      ( 18 Apr 2024 06:05 )             37.1     04-17    136  |  105  |  13.3  ----------------------------<  145<H>  4.2   |  20.0<L>  |  0.83    Ca    8.9      17 Apr 2024 10:50  Phos  2.1     04-17  Mg     1.6     04-17        Urinalysis Basic - ( 17 Apr 2024 10:50 )    Color: x / Appearance: x / SG: x / pH: x  Gluc: 145 mg/dL / Ketone: x  / Bili: x / Urobili: x   Blood: x / Protein: x / Nitrite: x   Leuk Esterase: x / RBC: x / WBC x   Sq Epi: x / Non Sq Epi: x / Bacteria: x

## 2024-04-18 NOTE — DISCHARGE NOTE PROVIDER - NSDCCPCAREPLAN_GEN_ALL_CORE_FT
PRINCIPAL DISCHARGE DIAGNOSIS  Diagnosis: Gastrointestinal stromal tumor (GIST)  Assessment and Plan of Treatment:   BATHING: Please do not submerge wound underwater. You may shower starting post op day #2. You have dermabond on your incisions  (purple looking skin glue) do not scrub or pick. It will come off on its on. You may pat it dry after showering.  ACTIVITY: No heavy lifting or straining. Otherwise, you may return to your usual level of physical activity. If you are taking narcotic pain medication (such as Percocet) DO NOT drive a car, operate machinery or make important decisions.  DIET: You may return to regular det. Encourage protein filled liquids.      RETURN TO THE EMERGENCY DEPARTMENT for any of the following - worsening pain, fever/chills, nausea/vomiting, altered mental status, chest pain, shortness of breath, or any other new / worsening symptom. to your usual diet.  NOTIFY YOUR SURGEON IF: You have any bleeding that does not stop, any pus draining from your wound(s), any fever (over 100.4 F) or chills, persistent nausea/vomiting, persistent diarrhea, or if your pain is not controlled on your discharge medications.  FOLLOW-UP: Please follow up with your primary care physician within 3-4weeks after surgery and your surgeon within 2 weeks of surgery.
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
## 2024-04-19 PROBLEM — Z86.79 PERSONAL HISTORY OF OTHER DISEASES OF THE CIRCULATORY SYSTEM: Chronic | Status: ACTIVE | Noted: 2024-04-12

## 2024-04-22 LAB — SURGICAL PATHOLOGY STUDY: SIGNIFICANT CHANGE UP

## 2024-04-30 PROBLEM — I10 ESSENTIAL (PRIMARY) HYPERTENSION: Chronic | Status: ACTIVE | Noted: 2024-04-12

## 2024-04-30 PROBLEM — Z86.39 PERSONAL HISTORY OF OTHER ENDOCRINE, NUTRITIONAL AND METABOLIC DISEASE: Chronic | Status: ACTIVE | Noted: 2024-04-12

## 2024-05-01 ENCOUNTER — OUTPATIENT (OUTPATIENT)
Dept: OUTPATIENT SERVICES | Facility: HOSPITAL | Age: 64
LOS: 1 days | Discharge: HOME | End: 2024-05-01

## 2024-05-01 ENCOUNTER — APPOINTMENT (OUTPATIENT)
Dept: SURGICAL ONCOLOGY | Facility: CLINIC | Age: 64
End: 2024-05-01
Payer: MEDICARE

## 2024-05-01 VITALS
BODY MASS INDEX: 35.79 KG/M2 | HEART RATE: 95 BPM | DIASTOLIC BLOOD PRESSURE: 86 MMHG | WEIGHT: 236.13 LBS | SYSTOLIC BLOOD PRESSURE: 137 MMHG | HEIGHT: 68 IN | TEMPERATURE: 97.6 F | OXYGEN SATURATION: 97 %

## 2024-05-01 DIAGNOSIS — C49.A2 GASTROINTESTINAL STROMAL TUMOR OF STOMACH: ICD-10-CM

## 2024-05-01 DIAGNOSIS — Z98.890 OTHER SPECIFIED POSTPROCEDURAL STATES: Chronic | ICD-10-CM

## 2024-05-01 DIAGNOSIS — Z98.890 OTHER SPECIFIED POSTPROCEDURAL STATES: ICD-10-CM

## 2024-05-01 PROCEDURE — 99212 OFFICE O/P EST SF 10 MIN: CPT

## 2024-05-01 PROCEDURE — 99024 POSTOP FOLLOW-UP VISIT: CPT

## 2024-05-01 NOTE — ASSESSMENT
[FreeTextEntry1] : 62 y/o M with a newly diagnosed 4.3 cm gastric GIST, biopsy proven, here to discuss management. On MRI at [facility], there was note of a 1.2cm right posterior hepatic lesion possibly suspicious for metastasis, with some internal debris, however upon review of imaging with [facility], it appears more benign in nature.  Pt presents today for initial POA s/p Robotic-assisted laparoscopic partial gastrectomy, intraoperative liver ultrasound, and a bilateral TAP block on 4/17/24. He is clinically doing well with the exception of umbilical discomfort at the surgical site. The umbilical site is where the specimen was extracted from and hence why the site was a bit longer and manipulated further intra-operatively which could still be causing such post-op discomfort. He was educated on avoiding any strenuous activity to protect his recent incisions. He will FU with YUNIOR tomorrow to discuss future plans of treatment and surveillance.   Plan: 1) FU with Dr. Supa MOJICA tomorrow to discuss future treatment and surveillance 2) RTO PRN

## 2024-05-01 NOTE — REVIEW OF SYSTEMS
[Patient Intake Form Reviewed] : Patient intake form was reviewed [Negative] : Psychiatric [de-identified] : Recent surgical trocar site incisions. Some discomfort at the umbilical surgical incision site.

## 2024-05-01 NOTE — RESULTS/DATA
[FreeTextEntry1] : Surgical Pathology Report - Auth (Verified)  Specimen(s) Submitted 1  Gastric Gist  Final Diagnosis Gastric Gist, segmental resection: Gastrointestinal stromal tumor, G1, spindle cell type, measuring 4.7 cm in diameter. Negative surgical resection margins; the mass is focally located 0.1 cm

## 2024-05-01 NOTE — REASON FOR VISIT
[de-identified] : Robotic-assisted laparoscopic partial gastrectomy, intraoperative liver ultrasound, and a bilateral TAP block [de-identified] : 04/17/24

## 2024-05-01 NOTE — PHYSICAL EXAM
[Normal] : oriented to person, place and time, with appropriate affect [de-identified] : RRR [de-identified] : Easy WOB [de-identified] : Surgical incisional sites healing well without any signs of infection..

## 2024-05-01 NOTE — HISTORY OF PRESENT ILLNESS
[de-identified] : Mr. 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  He explains that he was in his usual state of health when he had bronchitis in the Fall. He got a CXR, which showed bronchitis and some coronary calcifications. In March, he had a cardiac CT done, where a left upper quadrant mass was found. This led to an MRI, which showed a 4.1cm exophytic gastric mass. A subsequent EGD/biopsy showed a gastric GIST. he now presents for evaluation and management.  Of note, on the MRI, there was question of a 1.2cm right posterior hepatic lesion, possibly suspicious for a metastasis, though upon review with radiologists here at [facility], not consistent with metastasis. He is otherwise feeling well and is anxious to get this removed. He denies any GI symptoms or symptoms of bleeding. He has a history of UC, and his son had fibrolamellar HCC ( at 17 years fo age). He lives in [place].  24: Pt presents today for initial POA s/p Robotic-assisted laparoscopic partial gastrectomy, intraoperative liver ultrasound, and a bilateral TAP block on 24. He denies any issues, s/s- F/C/N/V, having normal BM with the exception of discomfort at the umbilical incisional site. He has an appointment with Dr. Hong tomorrow to discuss the plan moving forward.

## 2024-05-02 ENCOUNTER — APPOINTMENT (OUTPATIENT)
Dept: HEMATOLOGY ONCOLOGY | Facility: CLINIC | Age: 64
End: 2024-05-02
Payer: MEDICARE

## 2024-05-02 VITALS
RESPIRATION RATE: 16 BRPM | OXYGEN SATURATION: 96 % | WEIGHT: 235.89 LBS | SYSTOLIC BLOOD PRESSURE: 131 MMHG | DIASTOLIC BLOOD PRESSURE: 83 MMHG | HEIGHT: 67.87 IN | HEART RATE: 102 BPM | BODY MASS INDEX: 36.17 KG/M2 | TEMPERATURE: 97.7 F

## 2024-05-02 DIAGNOSIS — Z80.0 FAMILY HISTORY OF MALIGNANT NEOPLASM OF DIGESTIVE ORGANS: ICD-10-CM

## 2024-05-02 PROCEDURE — 99205 OFFICE O/P NEW HI 60 MIN: CPT

## 2024-05-02 RX ORDER — AMLODIPINE BESYLATE 5 MG/1
5 TABLET ORAL DAILY
Qty: 30 | Refills: 3 | Status: ACTIVE | COMMUNITY
Start: 2024-05-02

## 2024-05-03 ENCOUNTER — NON-APPOINTMENT (OUTPATIENT)
Age: 64
End: 2024-05-03

## 2024-05-03 NOTE — PHYSICAL EXAM
[Fully active, able to carry on all pre-disease performance without restriction] : Status 0 - Fully active, able to carry on all pre-disease performance without restriction [Normal] : affect appropriate [de-identified] : anicteric [de-identified] : no JVD [de-identified] : breathing comfortably, no audible wheeze [de-identified] : regular rate [de-identified] : non-distended; well healed laparascopic incision with slight erythema at the umbilicus but no discharge

## 2024-05-03 NOTE — HISTORY OF PRESENT ILLNESS
[Disease: _____________________] : Disease: [unfilled] [T: ___] : T[unfilled] [N: ___] : N[unfilled] [M: ___] : M[unfilled] [AJCC Stage: ____] : AJCC Stage: [unfilled] [de-identified] : In Fall 2023 at the age of 61 y/o with known PMHx of HTN, HLD, Chron's colitis (currently well controlled on Pentasa, last colonoscopy 1/2023 was negative), hypercalcemia s/p partial parathyroidectomy and elevated PSA (followed by Urology, MRI of the prostate 2/2023 with no significant findings, last 2/2024 stable) the patient developed bronchitis for which he underwent a CXR.  The CXR found coronary calcifications for which he underwent a follow up cardiac CT on 2/6/24 that found an incidental LUQ abdominal mass.  A follow up MRI of the Abdomen w/ and w/o contrast performed on 3/6/24 demonstrated a 4.1 cm moderate increased T2 signal mid gastric body mass with central non-enhancing core favoring a GIST.  There was also a 3.3 cm lateral left hepatic lobe hemangioma and a posterior right hepatic 1.2 cm T2 hyperintense nodule with layering debris/content that was indeterminant.  There was right kidney lower pole hemorrhagic renal cyst and an incidental pancreatic uncinate process 1.0 cm cystic structure with ductal dilation or suspicious features.  The patient underwent an EGD/EUS on 3/27/24 that found erythematous, edematous gastric mucosa with several superficial oozing erosions.  There was also an area of nodular mucosa, friable to the touch, in the duodenal bulb.  Biopsies were taken at both sites and noted inflammation.  The ultrasound noted a large 3-4 cm exophytic lesion at the level of the mid gastric body along the greater curvature adjacent to, but not touching, the spleen.  The mass was biopsied and was positive for gastrointestinal stromal tumor.  The patient established care with surgical oncologist, Dr. Alex Domingo on 4/4/24 and on 4/17/24 he underwent a robotic-assisted laparoscopic partial gastrectomy with Dr. Domingo.  An intraoperative liver ultrasound was performed that noted a hemangioma appearing lesion in segments 2 and 3 which were thought to be benign.  There was a cystic lesion in the right posterior hepatic lobe that also appeared to be benign.  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  The patient presented on 5/2/24 to establish oncologic care.  [de-identified] : low risk  [de-identified] : 4.7 x 4.5 x 3.7 cm 5 mitoses per 5 mm2 Negative margins  IHC: positive CD34, DOG1, , Ki67; negative for desmin, SMMH, S100

## 2024-05-08 ENCOUNTER — APPOINTMENT (OUTPATIENT)
Dept: SURGICAL ONCOLOGY | Facility: CLINIC | Age: 64
End: 2024-05-08

## 2024-05-28 ENCOUNTER — RESULT REVIEW (OUTPATIENT)
Age: 64
End: 2024-05-28

## 2024-06-11 ENCOUNTER — OFFICE (OUTPATIENT)
Dept: URBAN - METROPOLITAN AREA CLINIC 113 | Facility: CLINIC | Age: 64
Setting detail: OPHTHALMOLOGY
End: 2024-06-11
Payer: MEDICARE

## 2024-06-11 ENCOUNTER — RX ONLY (RX ONLY)
Age: 64
End: 2024-06-11

## 2024-06-11 DIAGNOSIS — H16.223: ICD-10-CM

## 2024-06-11 DIAGNOSIS — H25.13: ICD-10-CM

## 2024-06-11 DIAGNOSIS — H01.005: ICD-10-CM

## 2024-06-11 DIAGNOSIS — H01.004: ICD-10-CM

## 2024-06-11 DIAGNOSIS — H01.001: ICD-10-CM

## 2024-06-11 DIAGNOSIS — H01.002: ICD-10-CM

## 2024-06-11 PROCEDURE — 92014 COMPRE OPH EXAM EST PT 1/>: CPT | Performed by: STUDENT IN AN ORGANIZED HEALTH CARE EDUCATION/TRAINING PROGRAM

## 2024-06-11 ASSESSMENT — CONFRONTATIONAL VISUAL FIELD TEST (CVF)
OD_FINDINGS: FULL
OS_FINDINGS: FULL

## 2024-06-11 ASSESSMENT — LID EXAM ASSESSMENTS
OD_BLEPHARITIS: RLL RUL T
OS_COMMENTS: LIDS EVERTED
OS_BLEPHARITIS: LLL LUL T
OS_TRICHIASIS: ABSENT
OD_MEIBOMITIS: 1+
OS_MEIBOMITIS: 1+

## 2024-06-12 ENCOUNTER — OUTPATIENT (OUTPATIENT)
Dept: OUTPATIENT SERVICES | Facility: HOSPITAL | Age: 64
LOS: 1 days | End: 2024-06-12

## 2024-06-12 ENCOUNTER — APPOINTMENT (OUTPATIENT)
Dept: CT IMAGING | Facility: CLINIC | Age: 64
End: 2024-06-12
Payer: MEDICARE

## 2024-06-12 DIAGNOSIS — Z98.890 OTHER SPECIFIED POSTPROCEDURAL STATES: Chronic | ICD-10-CM

## 2024-06-12 DIAGNOSIS — C49.A2 GASTROINTESTINAL STROMAL TUMOR OF STOMACH: ICD-10-CM

## 2024-06-12 PROCEDURE — 74177 CT ABD & PELVIS W/CONTRAST: CPT | Mod: 26

## 2024-06-12 PROCEDURE — 71260 CT THORAX DX C+: CPT | Mod: 26

## 2024-06-19 ENCOUNTER — APPOINTMENT (OUTPATIENT)
Dept: HEMATOLOGY ONCOLOGY | Facility: CLINIC | Age: 64
End: 2024-06-19
Payer: MEDICARE

## 2024-06-19 VITALS
RESPIRATION RATE: 16 BRPM | TEMPERATURE: 97.2 F | OXYGEN SATURATION: 97 % | HEART RATE: 84 BPM | WEIGHT: 235.76 LBS | SYSTOLIC BLOOD PRESSURE: 122 MMHG | DIASTOLIC BLOOD PRESSURE: 80 MMHG | HEIGHT: 68 IN | BODY MASS INDEX: 35.73 KG/M2

## 2024-06-19 DIAGNOSIS — C49.A2 GASTROINTESTINAL STROMAL TUMOR OF STOMACH: ICD-10-CM

## 2024-06-19 PROCEDURE — 99214 OFFICE O/P EST MOD 30 MIN: CPT

## 2024-06-19 PROCEDURE — G2211 COMPLEX E/M VISIT ADD ON: CPT

## 2024-06-19 NOTE — PHYSICAL EXAM
[Fully active, able to carry on all pre-disease performance without restriction] : Status 0 - Fully active, able to carry on all pre-disease performance without restriction [Normal] : normal appearance, no rash, nodules, vesicles, ulcers, erythema [de-identified] : anicteric [de-identified] : breathing comfortably, no audible wheeze [de-identified] : no JVD [de-identified] : regular rate [de-identified] : non-distended; well healed laparascopic incision with slight erythema at the umbilicus, no tenderness, no palpable mass

## 2024-06-19 NOTE — HISTORY OF PRESENT ILLNESS
[Disease: _____________________] : Disease: [unfilled] [T: ___] : T[unfilled] [N: ___] : N[unfilled] [M: ___] : M[unfilled] [AJCC Stage: ____] : AJCC Stage: [unfilled] [de-identified] : In Fall 2023 at the age of 63 y/o with known PMHx of HTN, HLD, Chron's colitis (currently well controlled on Pentasa, last colonoscopy 1/2023 was negative), hypercalcemia s/p partial parathyroidectomy and elevated PSA (followed by Urology, MRI of the prostate 2/2023 with no significant findings, last 2/2024 stable) the patient developed bronchitis for which he underwent a CXR.  The CXR found coronary calcifications for which he underwent a follow up cardiac CT on 2/6/24 that found an incidental LUQ abdominal mass.  A follow up MRI of the Abdomen w/ and w/o contrast performed on 3/6/24 demonstrated a 4.1 cm moderate increased T2 signal mid gastric body mass with central non-enhancing core favoring a GIST.  There was also a 3.3 cm lateral left hepatic lobe hemangioma and a posterior right hepatic 1.2 cm T2 hyperintense nodule with layering debris/content that was indeterminant.  There was right kidney lower pole hemorrhagic renal cyst and an incidental pancreatic uncinate process 1.0 cm cystic structure with ductal dilation or suspicious features.  The patient underwent an EGD/EUS on 3/27/24 that found erythematous, edematous gastric mucosa with several superficial oozing erosions.  There was also an area of nodular mucosa, friable to the touch, in the duodenal bulb.  Biopsies were taken at both sites and noted inflammation.  The ultrasound noted a large 3-4 cm exophytic lesion at the level of the mid gastric body along the greater curvature adjacent to, but not touching, the spleen.  The mass was biopsied and was positive for gastrointestinal stromal tumor.  The patient established care with surgical oncologist, Dr. Alex Domingo on 4/4/24 and on 4/17/24 he underwent a robotic-assisted laparoscopic partial gastrectomy with Dr. Domingo.  An intraoperative liver ultrasound was performed that noted a hemangioma appearing lesion in segments 2 and 3 which were thought to be benign.  There was a cystic lesion in the right posterior hepatic lobe that also appeared to be benign.  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  The patient presented on 5/2/24 to establish oncologic care.  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 He denies N/V, abdominal pain, diarrhea or constipation. He has resumed his regular routine. His abdominal wound has been healing well. He has slight early satiety.

## 2024-07-09 ENCOUNTER — TRANSCRIPTION ENCOUNTER (OUTPATIENT)
Age: 64
End: 2024-07-09

## 2024-07-22 ENCOUNTER — TRANSCRIPTION ENCOUNTER (OUTPATIENT)
Age: 64
End: 2024-07-22

## 2024-07-25 ENCOUNTER — APPOINTMENT (OUTPATIENT)
Dept: ORTHOPEDIC SURGERY | Facility: CLINIC | Age: 64
End: 2024-07-25
Payer: MEDICARE

## 2024-07-25 DIAGNOSIS — S83.242A OTHER TEAR OF MEDIAL MENISCUS, CURRENT INJURY, LEFT KNEE, INITIAL ENCOUNTER: ICD-10-CM

## 2024-07-25 DIAGNOSIS — M17.12 UNILATERAL PRIMARY OSTEOARTHRITIS, LEFT KNEE: ICD-10-CM

## 2024-07-25 PROCEDURE — 99214 OFFICE O/P EST MOD 30 MIN: CPT | Mod: 25

## 2024-07-25 PROCEDURE — 20610 DRAIN/INJ JOINT/BURSA W/O US: CPT | Mod: LT

## 2024-07-25 NOTE — HISTORY OF PRESENT ILLNESS
[de-identified] : 07/25/2024: f/u for left knee. previous gel injection was 03/28/2024 with relief for 1 month. Patient is walking 5x a week. pain has returned.   03/28/24: Follow up for the LT knee. Pt here today for Euflexxa #3 injection. feels similar to last visit  3/21/24: Follow up left knee. Patient presents today for Euflexxa injection #2. mild relief from first injection.   03/14/24: Follow up injection for the LT knee. CSI last visit with releif for a short while. would like to discuss additional injection options.   11/16/23: pt presents for FUV of the left knee s/p gel injection series. He reports it relieved symptoms for about 2 months and then returned. He is interested in another injection today  Body part causing symptoms is the left knee Pain score at rest is  1 Pain score during activity is 4 Here today to continue Euflexxa injections, today in #3  Reports partial relief

## 2024-07-25 NOTE — IMAGING
[de-identified] : (Physical Exam: Knee)\par  \par  Laterality is left\par  \par  Patient is in no acute distress, alert and oriented\par  Sensation is grossly intact to light touch in the foot\par  Motor function is 5/5 in the foot\par  Capillary refill is less than 2 seconds in the toes\par  Lymphadenopathy is not present\par  Peripheral edema is not present\par  \par  Skin is intact \par  Effusion is not present \par  Atrophy is not present\par  Antalgic gait is not present \par  \par  Medial joint line tenderness is not present \par  Lateral joint line tenderness is present \par  Patellar tenderness is present \par  Calf tenderness is not present \par  \par  Knee extension is 0\par  Knee flexion is 125\par  \par  Quadriceps strength is 5/5\par  Hamstring strength is 5/5\par  \par  Lachman is normal \par  Posterior drawer is normal\par  Varus stress stability is normal\par  Valgus stress stability is normal\par  \par  Medial Lacho test is normal\par  Lateral Lacho test is abnormal\par  Patellofemoral grind test is abnormal\par  Patellar apprehension test is normal\par

## 2024-07-25 NOTE — DISCUSSION/SUMMARY
[de-identified] : (Assessment and Plan)   History, clinical examination and imaging were most consistent with: -left knee osteoarthritis   The diagnosis was explained in detail. The potential non-surgical and surgical treatments were reviewed. The relative risks and benefits of each option were considered relative to the patients age, activity level, medical history, symptom severity and previously attempted treatments.   The patient was advised to consult with their primary medical provider prior to initiation of any new medications to reduce the risk of adverse effects specific to their long-term home medications and medical history. The risk of gastrointestinal irritation and kidney injury specific to long-term NSAID use was discussed.     -Cortisone injection performed today for symptom relief.  -Over the counter acetaminophen as needed for pain. -We discussed that he does not have meniscal tear on MRI and that arthroscopic debridement would only be considered as a last resort treatment if symptoms persisted after injections. Ultimately, he may require knee replacement to definitively address his arthritic changes.  -Follow up in 3 months, consider repeat CSI vs surgical options.      (MDM)   Problem Complexity -Moderate: chronic illness with exacerbation   Risk -Moderate: injection   (Procedure: Corticosteroid Injection)   Injection location was the: -left knee joint   Injection contents were: 40 mg of kenalog and 5 mL of 1% lidocaine   Procedure Details: -Informed consent was obtained. Discussed possible risks of corticosteroid injection including hyperglycemia, infection and skin discoloration. -Discussed that due to infection risk, an interval between injection and any future surgery is 6 weeks for arthroscopy and 3 months for arthroplasty. -Injection performed using aseptic technique. Hemostasis was confirmed. -Procedure was tolerated well with no complications.

## 2024-08-02 ENCOUNTER — APPOINTMENT (OUTPATIENT)
Dept: GASTROENTEROLOGY | Facility: CLINIC | Age: 64
End: 2024-08-02
Payer: MEDICARE

## 2024-08-02 VITALS
OXYGEN SATURATION: 98 % | BODY MASS INDEX: 36.37 KG/M2 | HEART RATE: 83 BPM | RESPIRATION RATE: 14 BRPM | SYSTOLIC BLOOD PRESSURE: 160 MMHG | DIASTOLIC BLOOD PRESSURE: 75 MMHG | HEIGHT: 68 IN | WEIGHT: 240 LBS

## 2024-08-02 DIAGNOSIS — C49.A0 GASTROINTESTINAL STOMACL TUMOR,UNSPECIFIED SITE: ICD-10-CM

## 2024-08-02 DIAGNOSIS — K50.10 CROHN'S DISEASE OF LARGE INTESTINE W/OUT COMPLICATIONS: ICD-10-CM

## 2024-08-02 PROCEDURE — 99215 OFFICE O/P EST HI 40 MIN: CPT

## 2024-08-02 NOTE — HISTORY OF PRESENT ILLNESS
Ok to go back up to 80 mg daily, then update dr. Kent on Monday.   [FreeTextEntry1] : 3/5/2021.  Internal hemorrhoids.  Negative for dysplasia biopsies.  1/17/2019 normal colonoscopy negative biopsies.  1/22/2017 normal colonoscopy.  Negative biopsies.  4/9/2009.  Sigmoid colitis negative biopsies. [de-identified] : Gastric GIST and subsequent CT scan after removal of gastric GIST showing hepatic hemangioma.

## 2024-08-02 NOTE — PHYSICAL EXAM
[Alert] : alert [Normal Voice/Communication] : normal voice/communication [Healthy Appearing] : healthy appearing [No Acute Distress] : no acute distress [Well Developed] : well developed [Well Nourished] : well nourished [Sclera] : the sclera and conjunctiva were normal [Hearing Threshold Finger Rub Not [place]] : hearing was normal [Normal Lips/Gums] : the lips and gums were normal [Oropharynx] : the oropharynx was normal [Normal Appearance] : the appearance of the neck was normal [No Neck Mass] : no neck mass was observed [No Respiratory Distress] : no respiratory distress [No Acc Muscle Use] : no accessory muscle use [Respiration, Rhythm And Depth] : normal respiratory rhythm and effort [Auscultation Breath Sounds / Voice Sounds] : lungs were clear to auscultation bilaterally [Heart Rate And Rhythm] : heart rate was normal and rhythm regular [Normal S1, S2] : normal S1 and S2 [Murmurs] : no murmurs [None] : no edema [Bowel Sounds] : normal bowel sounds [Abdomen Tenderness] : non-tender [No Masses] : no abdominal mass palpated [Abdomen Soft] : soft [] : no hepatosplenomegaly [No CVA Tenderness] : no CVA  tenderness [Abnormal Walk] : normal gait [No Joint Swelling] : no joint swelling seen [Normal Color / Pigmentation] : normal skin color and pigmentation [Oriented To Time, Place, And Person] : oriented to person, place, and time [de-identified] : Patient is overweight [de-identified] : Small laparoscopic surgical scars from recent resection. [de-identified] : Deferred until colonoscopy

## 2024-08-02 NOTE — REASON FOR VISIT
[Follow-up] : a follow-up of an existing diagnosis [FreeTextEntry1] : Gastric GIST and Crohn's disease

## 2024-08-02 NOTE — ASSESSMENT
[FreeTextEntry1] : Impression: Gastric GIST status post resection with clear margins and no metastatic disease therefore he does not require adjuvant chemotherapy and has establish care with hematologist and oncologist.  Crohn's colitis currently in remission with Pentasa 4 g daily.  Patient states that generic mesalamine did not work as well for him and caused him GI upset.  Recommendations: Patient should have namebrand Pentasa for reasons outlined above.  Continued oncologic follow-up at as per hematology and oncology.  He is to return to see me in 6 months time for follow-up evaluation and appeared to understand all of the above instructions, information, and management plan.

## 2024-10-17 ENCOUNTER — APPOINTMENT (OUTPATIENT)
Dept: ORTHOPEDIC SURGERY | Facility: CLINIC | Age: 64
End: 2024-10-17
Payer: MEDICARE

## 2024-10-17 DIAGNOSIS — M17.12 UNILATERAL PRIMARY OSTEOARTHRITIS, LEFT KNEE: ICD-10-CM

## 2024-10-17 DIAGNOSIS — S83.242A OTHER TEAR OF MEDIAL MENISCUS, CURRENT INJURY, LEFT KNEE, INITIAL ENCOUNTER: ICD-10-CM

## 2024-10-17 PROCEDURE — 20610 DRAIN/INJ JOINT/BURSA W/O US: CPT | Mod: LT

## 2024-10-17 PROCEDURE — 99214 OFFICE O/P EST MOD 30 MIN: CPT | Mod: 25

## 2024-10-25 ENCOUNTER — TRANSCRIPTION ENCOUNTER (OUTPATIENT)
Age: 64
End: 2024-10-25

## 2024-11-01 ENCOUNTER — APPOINTMENT (OUTPATIENT)
Dept: GASTROENTEROLOGY | Facility: CLINIC | Age: 64
End: 2024-11-01
Payer: MEDICARE

## 2024-11-01 VITALS
OXYGEN SATURATION: 97 % | WEIGHT: 240 LBS | HEART RATE: 90 BPM | SYSTOLIC BLOOD PRESSURE: 150 MMHG | DIASTOLIC BLOOD PRESSURE: 75 MMHG | BODY MASS INDEX: 36.37 KG/M2 | HEIGHT: 68 IN

## 2024-11-01 DIAGNOSIS — R10.13 EPIGASTRIC PAIN: ICD-10-CM

## 2024-11-01 DIAGNOSIS — R68.81 EARLY SATIETY: ICD-10-CM

## 2024-11-01 PROCEDURE — 99214 OFFICE O/P EST MOD 30 MIN: CPT

## 2024-11-04 ENCOUNTER — TRANSCRIPTION ENCOUNTER (OUTPATIENT)
Age: 64
End: 2024-11-04

## 2024-11-04 LAB — UREA BREATH TEST QL: NEGATIVE

## 2024-11-08 ENCOUNTER — OUTPATIENT (OUTPATIENT)
Dept: OUTPATIENT SERVICES | Facility: HOSPITAL | Age: 64
LOS: 1 days | End: 2024-11-08

## 2024-11-08 ENCOUNTER — APPOINTMENT (OUTPATIENT)
Dept: CT IMAGING | Facility: CLINIC | Age: 64
End: 2024-11-08

## 2024-11-08 DIAGNOSIS — Z98.890 OTHER SPECIFIED POSTPROCEDURAL STATES: Chronic | ICD-10-CM

## 2024-11-08 DIAGNOSIS — C49.A2 GASTROINTESTINAL STROMAL TUMOR OF STOMACH: ICD-10-CM

## 2024-11-08 PROCEDURE — 74177 CT ABD & PELVIS W/CONTRAST: CPT | Mod: 26

## 2024-11-11 ENCOUNTER — OUTPATIENT (OUTPATIENT)
Dept: OUTPATIENT SERVICES | Facility: HOSPITAL | Age: 64
LOS: 1 days | Discharge: HOME | End: 2024-11-11

## 2024-11-11 DIAGNOSIS — Z98.890 OTHER SPECIFIED POSTPROCEDURAL STATES: Chronic | ICD-10-CM

## 2024-11-11 DIAGNOSIS — C49.A2 GASTROINTESTINAL STROMAL TUMOR OF STOMACH: ICD-10-CM

## 2024-11-18 ENCOUNTER — APPOINTMENT (OUTPATIENT)
Dept: HEMATOLOGY ONCOLOGY | Facility: CLINIC | Age: 64
End: 2024-11-18
Payer: MEDICARE

## 2024-11-18 ENCOUNTER — NON-APPOINTMENT (OUTPATIENT)
Age: 64
End: 2024-11-18

## 2024-11-18 VITALS
WEIGHT: 235.89 LBS | BODY MASS INDEX: 35.87 KG/M2 | HEART RATE: 88 BPM | OXYGEN SATURATION: 97 % | TEMPERATURE: 97.9 F | DIASTOLIC BLOOD PRESSURE: 83 MMHG | RESPIRATION RATE: 16 BRPM | SYSTOLIC BLOOD PRESSURE: 132 MMHG

## 2024-11-18 DIAGNOSIS — C49.A2 GASTROINTESTINAL STROMAL TUMOR OF STOMACH: ICD-10-CM

## 2024-11-18 PROCEDURE — G2211 COMPLEX E/M VISIT ADD ON: CPT

## 2024-11-18 PROCEDURE — 99214 OFFICE O/P EST MOD 30 MIN: CPT

## 2025-01-02 ENCOUNTER — TRANSCRIPTION ENCOUNTER (OUTPATIENT)
Age: 65
End: 2025-01-02

## 2025-01-02 ENCOUNTER — RESULT REVIEW (OUTPATIENT)
Age: 65
End: 2025-01-02

## 2025-01-02 ENCOUNTER — NON-APPOINTMENT (OUTPATIENT)
Age: 65
End: 2025-01-02

## 2025-01-02 ENCOUNTER — APPOINTMENT (OUTPATIENT)
Dept: GASTROENTEROLOGY | Facility: GI CENTER | Age: 65
End: 2025-01-02
Payer: MEDICARE

## 2025-01-02 ENCOUNTER — OUTPATIENT (OUTPATIENT)
Dept: OUTPATIENT SERVICES | Facility: HOSPITAL | Age: 65
LOS: 1 days | End: 2025-01-02
Payer: MEDICARE

## 2025-01-02 DIAGNOSIS — Z98.890 OTHER SPECIFIED POSTPROCEDURAL STATES: Chronic | ICD-10-CM

## 2025-01-02 DIAGNOSIS — R10.13 EPIGASTRIC PAIN: ICD-10-CM

## 2025-01-02 DIAGNOSIS — C49.A0 GASTROINTESTINAL STOMACL TUMOR,UNSPECIFIED SITE: ICD-10-CM

## 2025-01-02 DIAGNOSIS — K29.60 OTHER GASTRITIS W/OUT BLEEDING: ICD-10-CM

## 2025-01-02 PROCEDURE — 88305 TISSUE EXAM BY PATHOLOGIST: CPT | Mod: 26

## 2025-01-02 PROCEDURE — 88342 IMHCHEM/IMCYTCHM 1ST ANTB: CPT

## 2025-01-02 PROCEDURE — 88342 IMHCHEM/IMCYTCHM 1ST ANTB: CPT | Mod: 26

## 2025-01-02 PROCEDURE — 43239 EGD BIOPSY SINGLE/MULTIPLE: CPT

## 2025-01-02 PROCEDURE — 88305 TISSUE EXAM BY PATHOLOGIST: CPT

## 2025-01-02 RX ORDER — SUCRALFATE 1 G/1
1 TABLET ORAL 4 TIMES DAILY
Qty: 120 | Refills: 2 | Status: ACTIVE | COMMUNITY
Start: 2025-01-02 | End: 1900-01-01

## 2025-01-02 RX ORDER — PANTOPRAZOLE 40 MG/1
40 TABLET, DELAYED RELEASE ORAL
Qty: 60 | Refills: 5 | Status: ACTIVE | COMMUNITY
Start: 2025-01-02 | End: 1900-01-01

## 2025-01-02 RX ORDER — SUCRALFATE 1 G/10ML
1 SUSPENSION ORAL 4 TIMES DAILY
Qty: 1200 | Refills: 5 | Status: ACTIVE | COMMUNITY
Start: 2025-01-02 | End: 1900-01-01

## 2025-01-06 LAB — SURGICAL PATHOLOGY STUDY: SIGNIFICANT CHANGE UP

## 2025-02-12 ENCOUNTER — OFFICE (OUTPATIENT)
Dept: URBAN - METROPOLITAN AREA CLINIC 113 | Facility: CLINIC | Age: 65
Setting detail: OPHTHALMOLOGY
End: 2025-02-12
Payer: MEDICARE

## 2025-02-12 DIAGNOSIS — H01.005: ICD-10-CM

## 2025-02-12 DIAGNOSIS — H01.004: ICD-10-CM

## 2025-02-12 DIAGNOSIS — H01.001: ICD-10-CM

## 2025-02-12 DIAGNOSIS — H16.223: ICD-10-CM

## 2025-02-12 DIAGNOSIS — H01.002: ICD-10-CM

## 2025-02-12 DIAGNOSIS — H25.13: ICD-10-CM

## 2025-02-12 PROCEDURE — 99213 OFFICE O/P EST LOW 20 MIN: CPT | Performed by: STUDENT IN AN ORGANIZED HEALTH CARE EDUCATION/TRAINING PROGRAM

## 2025-02-12 ASSESSMENT — KERATOMETRY
OS_K2POWER_DIOPTERS: 42.50
METHOD_AUTO_MANUAL: AUTO
OS_K1POWER_DIOPTERS: 42.25
OD_K2POWER_DIOPTERS: 43.25
OS_AXISANGLE_DEGREES: 116
OD_AXISANGLE_DEGREES: 013
OD_K1POWER_DIOPTERS: 43.00

## 2025-02-12 ASSESSMENT — REFRACTION_CURRENTRX
OD_SPHERE: -1.00
OS_CYLINDER: -0.25
OD_AXIS: 108
OD_VPRISM_DIRECTION: SV
OD_SPHERE: -0.25
OD_AXIS: 108
OS_CYLINDER: -0.25
OS_OVR_VA: 20/
OD_CYLINDER: -0.25
OS_VPRISM_DIRECTION: SV
OD_OVR_VA: 20/
OS_AXIS: 135
OD_VPRISM_DIRECTION: SV
OS_AXIS: 116
OD_OVR_VA: 20/
OD_CYLINDER: -0.75
OS_SPHERE: -0.75
OS_SPHERE: -0.25
OS_VPRISM_DIRECTION: SV
OS_OVR_VA: 20/

## 2025-02-12 ASSESSMENT — REFRACTION_AUTOREFRACTION
OS_SPHERE: +0.50
OD_AXIS: 105
OD_SPHERE: -0.25
OS_CYLINDER: -0.75
OD_CYLINDER: -0.50
OS_AXIS: 116

## 2025-02-12 ASSESSMENT — REFRACTION_MANIFEST
OS_SPHERE: PLANO
OS_ADD: +1.25
OS_AXIS: 124
OS_ADD: +1.75
OD_ADD: +1.75
OD_CYLINDER: -0.50
OD_SPHERE: -0.50
OD_VA1: 20/20
OD_AXIS: 114
OS_VA1: 20/20
OD_VA2: 20/20
OS_AXIS: 121
OS_VA1: 20/20
OD_VA1: 20/20
OD_ADD: +1.25
OS_SPHERE: -0.25
OD_AXIS: 112
OD_SPHERE: -0.25
OD_VA2: 20/20
OD_CYLINDER: -0.75
OS_CYLINDER: -0.50
OS_VA2: 20/20
OS_VA2: 20/20
OS_CYLINDER: -0.25

## 2025-02-12 ASSESSMENT — CONFRONTATIONAL VISUAL FIELD TEST (CVF)
OS_FINDINGS: FULL
OD_FINDINGS: FULL

## 2025-02-12 ASSESSMENT — SUPERFICIAL PUNCTATE KERATITIS (SPK)
OD_SPK: T
OS_SPK: T

## 2025-02-12 ASSESSMENT — TONOMETRY
OS_IOP_MMHG: 18
OD_IOP_MMHG: 18

## 2025-02-12 ASSESSMENT — VISUAL ACUITY
OS_BCVA: 20/20
OD_BCVA: 20/20

## 2025-02-12 ASSESSMENT — LID EXAM ASSESSMENTS
OS_MEIBOMITIS: 1+
OD_MEIBOMITIS: 1+
OD_BLEPHARITIS: RLL RUL T
OS_BLEPHARITIS: LLL LUL T

## 2025-02-18 ENCOUNTER — APPOINTMENT (OUTPATIENT)
Dept: UROLOGY | Facility: CLINIC | Age: 65
End: 2025-02-18

## 2025-02-19 ENCOUNTER — APPOINTMENT (OUTPATIENT)
Dept: UROLOGY | Facility: CLINIC | Age: 65
End: 2025-02-19

## 2025-02-19 VITALS — HEART RATE: 65 BPM | DIASTOLIC BLOOD PRESSURE: 88 MMHG | SYSTOLIC BLOOD PRESSURE: 139 MMHG

## 2025-02-19 LAB
BILIRUB UR QL STRIP: NORMAL
CLARITY UR: CLEAR
COLLECTION METHOD: NORMAL
GLUCOSE UR-MCNC: NORMAL
HCG UR QL: 0.2 EU/DL
HGB UR QL STRIP.AUTO: NORMAL
KETONES UR-MCNC: NORMAL
LEUKOCYTE ESTERASE UR QL STRIP: NORMAL
NITRITE UR QL STRIP: NORMAL
PH UR STRIP: 6
PROT UR STRIP-MCNC: NORMAL
SP GR UR STRIP: 1.01

## 2025-02-19 PROCEDURE — 81003 URINALYSIS AUTO W/O SCOPE: CPT | Mod: QW

## 2025-02-19 PROCEDURE — 99213 OFFICE O/P EST LOW 20 MIN: CPT

## 2025-02-20 ENCOUNTER — APPOINTMENT (OUTPATIENT)
Dept: ORTHOPEDIC SURGERY | Facility: CLINIC | Age: 65
End: 2025-02-20
Payer: MEDICARE

## 2025-02-20 DIAGNOSIS — M17.12 UNILATERAL PRIMARY OSTEOARTHRITIS, LEFT KNEE: ICD-10-CM

## 2025-02-20 DIAGNOSIS — S83.242A OTHER TEAR OF MEDIAL MENISCUS, CURRENT INJURY, LEFT KNEE, INITIAL ENCOUNTER: ICD-10-CM

## 2025-02-20 PROCEDURE — 20610 DRAIN/INJ JOINT/BURSA W/O US: CPT | Mod: LT

## 2025-02-20 PROCEDURE — 99214 OFFICE O/P EST MOD 30 MIN: CPT | Mod: 25

## 2025-02-25 LAB
4K SCORE: 34.6
FREE PSA (BIOREFERENCE): 0.72 NG/ML
PERCENT FREE PSA: 16 %
TOTAL PSA (BIOREFERENCE): 4.57 NG/ML

## 2025-03-03 ENCOUNTER — OUTPATIENT (OUTPATIENT)
Dept: OUTPATIENT SERVICES | Facility: HOSPITAL | Age: 65
LOS: 1 days | End: 2025-03-03
Payer: MEDICARE

## 2025-03-03 DIAGNOSIS — Z98.890 OTHER SPECIFIED POSTPROCEDURAL STATES: Chronic | ICD-10-CM

## 2025-03-03 DIAGNOSIS — R97.20 ELEVATED PROSTATE SPECIFIC ANTIGEN [PSA]: ICD-10-CM

## 2025-03-03 PROCEDURE — 76377 3D RENDER W/INTRP POSTPROCES: CPT | Mod: 26

## 2025-03-14 ENCOUNTER — APPOINTMENT (OUTPATIENT)
Dept: CT IMAGING | Facility: CLINIC | Age: 65
End: 2025-03-14

## 2025-03-14 ENCOUNTER — OUTPATIENT (OUTPATIENT)
Dept: OUTPATIENT SERVICES | Facility: HOSPITAL | Age: 65
LOS: 1 days | End: 2025-03-14

## 2025-03-14 DIAGNOSIS — Z98.890 OTHER SPECIFIED POSTPROCEDURAL STATES: Chronic | ICD-10-CM

## 2025-03-14 DIAGNOSIS — C49.A2 GASTROINTESTINAL STROMAL TUMOR OF STOMACH: ICD-10-CM

## 2025-03-14 PROCEDURE — 74177 CT ABD & PELVIS W/CONTRAST: CPT | Mod: 26

## 2025-03-17 ENCOUNTER — OUTPATIENT (OUTPATIENT)
Dept: OUTPATIENT SERVICES | Facility: HOSPITAL | Age: 65
LOS: 1 days | End: 2025-03-17
Payer: MEDICARE

## 2025-03-17 ENCOUNTER — RESULT REVIEW (OUTPATIENT)
Age: 65
End: 2025-03-17

## 2025-03-17 ENCOUNTER — APPOINTMENT (OUTPATIENT)
Dept: MRI IMAGING | Facility: CLINIC | Age: 65
End: 2025-03-17
Payer: MEDICARE

## 2025-03-17 DIAGNOSIS — Z98.890 OTHER SPECIFIED POSTPROCEDURAL STATES: Chronic | ICD-10-CM

## 2025-03-17 DIAGNOSIS — R97.20 ELEVATED PROSTATE SPECIFIC ANTIGEN [PSA]: ICD-10-CM

## 2025-03-17 PROCEDURE — 72197 MRI PELVIS W/O & W/DYE: CPT

## 2025-03-17 PROCEDURE — 76498 UNLISTED MR PROCEDURE: CPT

## 2025-03-17 PROCEDURE — A9585: CPT

## 2025-03-17 PROCEDURE — 76498P: CUSTOM | Mod: 26

## 2025-03-17 PROCEDURE — 72197 MRI PELVIS W/O & W/DYE: CPT | Mod: 26

## 2025-03-18 ENCOUNTER — APPOINTMENT (OUTPATIENT)
Dept: GASTROENTEROLOGY | Facility: CLINIC | Age: 65
End: 2025-03-18
Payer: MEDICARE

## 2025-03-18 VITALS
DIASTOLIC BLOOD PRESSURE: 84 MMHG | OXYGEN SATURATION: 98 % | BODY MASS INDEX: 37.13 KG/M2 | SYSTOLIC BLOOD PRESSURE: 160 MMHG | HEART RATE: 90 BPM | TEMPERATURE: 98 F | HEIGHT: 68 IN | WEIGHT: 245 LBS | RESPIRATION RATE: 16 BRPM

## 2025-03-18 DIAGNOSIS — K50.10 CROHN'S DISEASE OF LARGE INTESTINE W/OUT COMPLICATIONS: ICD-10-CM

## 2025-03-18 DIAGNOSIS — K29.60 OTHER GASTRITIS W/OUT BLEEDING: ICD-10-CM

## 2025-03-18 PROCEDURE — 99214 OFFICE O/P EST MOD 30 MIN: CPT

## 2025-03-18 RX ORDER — MESALAMINE 500 MG/1
500 CAPSULE ORAL
Qty: 720 | Refills: 3 | Status: ACTIVE | COMMUNITY
Start: 2025-03-18 | End: 1900-01-01

## 2025-03-18 RX ORDER — SODIUM SULFATE, POTASSIUM SULFATE AND MAGNESIUM SULFATE 1.6; 3.13; 17.5 G/177ML; G/177ML; G/177ML
17.5-3.13-1.6 SOLUTION ORAL
Qty: 2 | Refills: 0 | Status: ACTIVE | COMMUNITY
Start: 2025-03-18 | End: 1900-01-01

## 2025-03-21 ENCOUNTER — OUTPATIENT (OUTPATIENT)
Dept: OUTPATIENT SERVICES | Facility: HOSPITAL | Age: 65
LOS: 1 days | Discharge: HOME | End: 2025-03-21

## 2025-03-21 DIAGNOSIS — Z98.890 OTHER SPECIFIED POSTPROCEDURAL STATES: Chronic | ICD-10-CM

## 2025-03-21 DIAGNOSIS — C49.A2 GASTROINTESTINAL STROMAL TUMOR OF STOMACH: ICD-10-CM

## 2025-03-24 ENCOUNTER — OUTPATIENT (OUTPATIENT)
Dept: OUTPATIENT SERVICES | Facility: HOSPITAL | Age: 65
LOS: 1 days | End: 2025-03-24
Payer: MEDICARE

## 2025-03-24 ENCOUNTER — APPOINTMENT (OUTPATIENT)
Dept: UROLOGY | Facility: CLINIC | Age: 65
End: 2025-03-24
Payer: MEDICARE

## 2025-03-24 ENCOUNTER — NON-APPOINTMENT (OUTPATIENT)
Age: 65
End: 2025-03-24

## 2025-03-24 ENCOUNTER — APPOINTMENT (OUTPATIENT)
Dept: HEMATOLOGY ONCOLOGY | Facility: CLINIC | Age: 65
End: 2025-03-24
Payer: MEDICARE

## 2025-03-24 VITALS
SYSTOLIC BLOOD PRESSURE: 143 MMHG | BODY MASS INDEX: 35.91 KG/M2 | TEMPERATURE: 97.9 F | DIASTOLIC BLOOD PRESSURE: 73 MMHG | OXYGEN SATURATION: 97 % | WEIGHT: 236.98 LBS | HEIGHT: 68 IN | RESPIRATION RATE: 16 BRPM | HEART RATE: 96 BPM

## 2025-03-24 DIAGNOSIS — C49.A2 GASTROINTESTINAL STROMAL TUMOR OF STOMACH: ICD-10-CM

## 2025-03-24 DIAGNOSIS — Z98.890 OTHER SPECIFIED POSTPROCEDURAL STATES: Chronic | ICD-10-CM

## 2025-03-24 DIAGNOSIS — Z00.00 ENCOUNTER FOR GENERAL ADULT MEDICAL EXAMINATION WITHOUT ABNORMAL FINDINGS: ICD-10-CM

## 2025-03-24 DIAGNOSIS — R97.20 ELEVATED PROSTATE, SPECIFIC ANTIGEN [PSA]: ICD-10-CM

## 2025-03-24 DIAGNOSIS — C49.A0 GASTROINTESTINAL STOMACL TUMOR,UNSPECIFIED SITE: ICD-10-CM

## 2025-03-24 PROCEDURE — 76377 3D RENDER W/INTRP POSTPROCES: CPT | Mod: 26

## 2025-03-24 PROCEDURE — G2211 COMPLEX E/M VISIT ADD ON: CPT

## 2025-03-24 PROCEDURE — 99213 OFFICE O/P EST LOW 20 MIN: CPT

## 2025-03-24 PROCEDURE — C8001: CPT

## 2025-03-28 ENCOUNTER — NON-APPOINTMENT (OUTPATIENT)
Age: 65
End: 2025-03-28

## 2025-03-31 ENCOUNTER — APPOINTMENT (OUTPATIENT)
Dept: UROLOGY | Facility: CLINIC | Age: 65
End: 2025-03-31
Payer: MEDICARE

## 2025-03-31 VITALS
SYSTOLIC BLOOD PRESSURE: 142 MMHG | OXYGEN SATURATION: 99 % | HEART RATE: 93 BPM | WEIGHT: 235 LBS | BODY MASS INDEX: 35.61 KG/M2 | DIASTOLIC BLOOD PRESSURE: 83 MMHG | RESPIRATION RATE: 16 BRPM | HEIGHT: 68 IN

## 2025-03-31 VITALS
HEART RATE: 84 BPM | SYSTOLIC BLOOD PRESSURE: 136 MMHG | DIASTOLIC BLOOD PRESSURE: 84 MMHG | OXYGEN SATURATION: 98 % | RESPIRATION RATE: 16 BRPM

## 2025-03-31 PROCEDURE — 76872 US TRANSRECTAL: CPT

## 2025-03-31 PROCEDURE — 55700: CPT

## 2025-04-01 ENCOUNTER — NON-APPOINTMENT (OUTPATIENT)
Age: 65
End: 2025-04-01

## 2025-04-11 LAB — CORE LAB BIOPSY: NORMAL

## 2025-04-14 ENCOUNTER — APPOINTMENT (OUTPATIENT)
Dept: UROLOGY | Facility: CLINIC | Age: 65
End: 2025-04-14
Payer: MEDICARE

## 2025-04-14 DIAGNOSIS — R97.20 ELEVATED PROSTATE, SPECIFIC ANTIGEN [PSA]: ICD-10-CM

## 2025-04-14 PROCEDURE — 99214 OFFICE O/P EST MOD 30 MIN: CPT

## 2025-04-21 ENCOUNTER — APPOINTMENT (OUTPATIENT)
Dept: UROLOGY | Facility: CLINIC | Age: 65
End: 2025-04-21

## 2025-05-21 ENCOUNTER — APPOINTMENT (OUTPATIENT)
Dept: UROLOGY | Facility: CLINIC | Age: 65
End: 2025-05-21

## 2025-06-16 ENCOUNTER — RX RENEWAL (OUTPATIENT)
Age: 65
End: 2025-06-16

## 2025-06-23 ENCOUNTER — TRANSCRIPTION ENCOUNTER (OUTPATIENT)
Age: 65
End: 2025-06-23

## 2025-06-23 ENCOUNTER — OUTPATIENT (OUTPATIENT)
Dept: OUTPATIENT SERVICES | Facility: HOSPITAL | Age: 65
LOS: 1 days | End: 2025-06-23
Payer: MEDICARE

## 2025-06-23 ENCOUNTER — APPOINTMENT (OUTPATIENT)
Dept: GASTROENTEROLOGY | Facility: GI CENTER | Age: 65
End: 2025-06-23
Payer: MEDICARE

## 2025-06-23 ENCOUNTER — RESULT REVIEW (OUTPATIENT)
Age: 65
End: 2025-06-23

## 2025-06-23 DIAGNOSIS — Z98.890 OTHER SPECIFIED POSTPROCEDURAL STATES: Chronic | ICD-10-CM

## 2025-06-23 DIAGNOSIS — K29.60 OTHER GASTRITIS WITHOUT BLEEDING: ICD-10-CM

## 2025-06-23 DIAGNOSIS — K50.10 CROHN'S DISEASE OF LARGE INTESTINE WITHOUT COMPLICATIONS: ICD-10-CM

## 2025-06-23 DIAGNOSIS — C49.A2 GASTROINTESTINAL STROMAL TUMOR OF STOMACH: ICD-10-CM

## 2025-06-23 PROCEDURE — 88342 IMHCHEM/IMCYTCHM 1ST ANTB: CPT | Mod: 26

## 2025-06-23 PROCEDURE — 43239 EGD BIOPSY SINGLE/MULTIPLE: CPT | Mod: 59

## 2025-06-23 PROCEDURE — 43239 EGD BIOPSY SINGLE/MULTIPLE: CPT

## 2025-06-23 PROCEDURE — 88342 IMHCHEM/IMCYTCHM 1ST ANTB: CPT

## 2025-06-23 PROCEDURE — 45380 COLONOSCOPY AND BIOPSY: CPT

## 2025-06-23 PROCEDURE — 88305 TISSUE EXAM BY PATHOLOGIST: CPT | Mod: 26

## 2025-06-23 PROCEDURE — 88305 TISSUE EXAM BY PATHOLOGIST: CPT

## 2025-06-26 ENCOUNTER — APPOINTMENT (OUTPATIENT)
Dept: ORTHOPEDIC SURGERY | Facility: CLINIC | Age: 65
End: 2025-06-26
Payer: MEDICARE

## 2025-06-26 LAB — SURGICAL PATHOLOGY STUDY: SIGNIFICANT CHANGE UP

## 2025-06-26 PROCEDURE — 20610 DRAIN/INJ JOINT/BURSA W/O US: CPT | Mod: LT

## 2025-06-26 PROCEDURE — 99214 OFFICE O/P EST MOD 30 MIN: CPT | Mod: 25

## 2025-07-09 ENCOUNTER — OFFICE (OUTPATIENT)
Dept: URBAN - METROPOLITAN AREA CLINIC 113 | Facility: CLINIC | Age: 65
Setting detail: OPHTHALMOLOGY
End: 2025-07-09
Payer: MEDICARE

## 2025-07-09 DIAGNOSIS — H01.002: ICD-10-CM

## 2025-07-09 DIAGNOSIS — H25.13: ICD-10-CM

## 2025-07-09 DIAGNOSIS — H01.001: ICD-10-CM

## 2025-07-09 DIAGNOSIS — H16.223: ICD-10-CM

## 2025-07-09 DIAGNOSIS — H01.005: ICD-10-CM

## 2025-07-09 DIAGNOSIS — H01.004: ICD-10-CM

## 2025-07-09 PROCEDURE — 92014 COMPRE OPH EXAM EST PT 1/>: CPT | Performed by: STUDENT IN AN ORGANIZED HEALTH CARE EDUCATION/TRAINING PROGRAM

## 2025-07-09 ASSESSMENT — REFRACTION_CURRENTRX
OD_OVR_VA: 20/
OD_OVR_VA: 20/
OS_AXIS: 135
OD_AXIS: 108
OD_VPRISM_DIRECTION: SV
OS_VPRISM_DIRECTION: SV
OD_SPHERE: -1.00
OD_VPRISM_DIRECTION: SV
OD_SPHERE: -0.25
OS_OVR_VA: 20/
OD_CYLINDER: -0.25
OS_CYLINDER: -0.25
OS_AXIS: 116
OS_OVR_VA: 20/
OD_CYLINDER: -0.75
OS_SPHERE: -0.75
OS_SPHERE: -0.25
OD_AXIS: 108
OS_CYLINDER: -0.25
OS_VPRISM_DIRECTION: SV

## 2025-07-09 ASSESSMENT — REFRACTION_MANIFEST
OS_AXIS: 124
OD_VA2: 20/20
OD_VA2: 20/20
OS_VA2: 20/20
OD_AXIS: 112
OS_SPHERE: PLANO
OD_ADD: +1.25
OS_AXIS: 121
OS_CYLINDER: -0.50
OS_VA1: 20/20
OD_SPHERE: -0.25
OS_VA1: 20/20
OD_VA1: 20/20
OS_ADD: +1.75
OS_CYLINDER: -0.25
OD_AXIS: 114
OD_CYLINDER: -0.75
OD_VA1: 20/20
OD_SPHERE: -0.50
OS_ADD: +1.25
OS_VA2: 20/20
OS_SPHERE: -0.25
OD_ADD: +1.75
OD_CYLINDER: -0.50

## 2025-07-09 ASSESSMENT — KERATOMETRY
METHOD_AUTO_MANUAL: AUTO
OS_K1POWER_DIOPTERS: 42.25
OD_K1POWER_DIOPTERS: 43.00
OS_K2POWER_DIOPTERS: 42.75
OD_K2POWER_DIOPTERS: 43.25
OD_AXISANGLE_DEGREES: 037
OS_AXISANGLE_DEGREES: 045

## 2025-07-09 ASSESSMENT — REFRACTION_AUTOREFRACTION
OD_SPHERE: -0.25
OS_CYLINDER: -0.50
OS_SPHERE: +0.50
OS_AXIS: 118
OD_CYLINDER: -0.50
OD_AXIS: 106

## 2025-07-09 ASSESSMENT — LID EXAM ASSESSMENTS
OS_MEIBOMITIS: 1+
OD_BLEPHARITIS: RLL RUL T
OS_BLEPHARITIS: LLL LUL T
OD_MEIBOMITIS: 1+

## 2025-07-09 ASSESSMENT — CONFRONTATIONAL VISUAL FIELD TEST (CVF)
OD_FINDINGS: FULL
OS_FINDINGS: FULL

## 2025-07-09 ASSESSMENT — SUPERFICIAL PUNCTATE KERATITIS (SPK)
OS_SPK: T
OD_SPK: T

## 2025-07-09 ASSESSMENT — VISUAL ACUITY
OS_BCVA: 20/20-
OD_BCVA: 20/20

## 2025-07-09 ASSESSMENT — TONOMETRY
OS_IOP_MMHG: 17
OD_IOP_MMHG: 17

## (undated) DEVICE — SUT SILK 2-0 30" TIES

## (undated) DEVICE — LIGASURE IMPACT

## (undated) DEVICE — SUT SILK 3-0 30" TIES

## (undated) DEVICE — XI OBTURATOR OPTICAL BLADELESS 8MM

## (undated) DEVICE — XI ARM FORCEP FENESTRATED BIPOLAR 8MM

## (undated) DEVICE — LUBRICANT INST ELECTROLUBE Z SOLUTION

## (undated) DEVICE — ELCTR CORD FOOTSWITCH 1PLR LAPSCP 10FT

## (undated) DEVICE — SUT VICRYL 0 27" CT-1

## (undated) DEVICE — ENDOCATCH 10MM SPECIMEN POUCH

## (undated) DEVICE — SUT PROLENE 4-0 36" RB-1

## (undated) DEVICE — TROCAR SURGIQUEST AIRSEAL 12MMX100MM

## (undated) DEVICE — SYR LUER SLIP TIP 50CC

## (undated) DEVICE — XI TIP COVER

## (undated) DEVICE — SUT MONOCRYL 4-0 27" PS-2 UNDYED

## (undated) DEVICE — FORCEP ENDOJAW AGTR LC W NDL 2.8MM 230CM DISP

## (undated) DEVICE — DRAPE GENERAL ENDOSCOPY

## (undated) DEVICE — SENSOR O2 FINGER ADULT

## (undated) DEVICE — ENDOCATCH II 15MM

## (undated) DEVICE — ELCTR GROUNDING PAD ADULT COVIDIEN

## (undated) DEVICE — DRAPE TOWEL BLUE STICKY

## (undated) DEVICE — LIGASURE MARYLAND 37CM

## (undated) DEVICE — XI ARM CLIP APPLIER LARGE

## (undated) DEVICE — XI SEAL UNIVERSIAL 5-12MM

## (undated) DEVICE — TUBING AIRSEAL TRI-LUMEN FILTERED

## (undated) DEVICE — VENODYNE/SCD SLEEVE CALF MEDIUM

## (undated) DEVICE — PACK IV START WITH CHG

## (undated) DEVICE — STAPLER SKIN PROXIMATE

## (undated) DEVICE — Device

## (undated) DEVICE — DENTURE CUP PINK

## (undated) DEVICE — LAP PAD 4 X 18"

## (undated) DEVICE — POSITIONER FOAM LAMINECTOMY ARM CRADLE (PINK)

## (undated) DEVICE — SSH-EUS RADIAL 1810834: Type: DURABLE MEDICAL EQUIPMENT

## (undated) DEVICE — SYR IV FLUSH SALINE 10ML 30/TY

## (undated) DEVICE — XI DRAPE COLUMN

## (undated) DEVICE — GLV 7.5 PROTEXIS (WHITE)

## (undated) DEVICE — SUT VLOC 180 3-0 9" GS-21 GREEN

## (undated) DEVICE — SUT SILK 0 30" TIES

## (undated) DEVICE — WARMING BLANKET UPPER ADULT

## (undated) DEVICE — UNDERPAD LINEN SAVER 23 X 36"

## (undated) DEVICE — XI CORD MONOPOLAR CAUTERY (GREEN)

## (undated) DEVICE — SOL IRR POUR NS 0.9% 1000ML

## (undated) DEVICE — TUBING CONNECTING 6MM 20FT

## (undated) DEVICE — KIT DEFENDO 4 OLY 4 PC

## (undated) DEVICE — SOL IRR BAG NS 0.9% 1000ML

## (undated) DEVICE — GOWN IMPERV XL

## (undated) DEVICE — SUT VLOC 180 3-0 9" V-20 GREEN

## (undated) DEVICE — SOL IRR POUR H2O 1000ML

## (undated) DEVICE — SUT PDS II 3-0 27" SH

## (undated) DEVICE — DRAPE INSTRUMENT POUCH 6.75" X 11"

## (undated) DEVICE — SUCTION YANKAUER NO CONTROL VENT

## (undated) DEVICE — XI ARM NEEDLE DRIVER SUTURECUT MEGA 8MM

## (undated) DEVICE — DRAIN PENROSE .25" X 12" SILICONE

## (undated) DEVICE — TAPE SILK 3"

## (undated) DEVICE — DRSG DERMABOND 0.7ML

## (undated) DEVICE — LIGASURE BLUNT TIP 37CM

## (undated) DEVICE — TUBING IV EXTENSION MACRO W CLAVE 7"

## (undated) DEVICE — SUT SILK 3-0 18" SH (POP-OFF)

## (undated) DEVICE — GOWN XL

## (undated) DEVICE — ELCTR BOVIE TIP BLADE INSULATED 4" EDGE

## (undated) DEVICE — SUT SILK 2-0 30" SH

## (undated) DEVICE — XI CORD BIPOLAR CAUTERY (BLUE)

## (undated) DEVICE — XI ARM FORCE BIPOLAR 8MM

## (undated) DEVICE — PACK ROBOTIC

## (undated) DEVICE — BLADE SCALPEL SAFETYLOCK #10

## (undated) DEVICE — POSITIONER FOAM EGG CRATE ULNAR 2PCS (PINK)

## (undated) DEVICE — CATH IV SAFE BC 22G X 1" (BLUE)

## (undated) DEVICE — XI ARM SCISSOR MONO CURVED

## (undated) DEVICE — FOLEY TRAY 16FR 5CC LF UMETER CLOSED

## (undated) DEVICE — XI ARM FORCEP MARYLAND BIPOLAR

## (undated) DEVICE — SOL BAG NS 0.9% 1000ML

## (undated) DEVICE — FORCEP RADIAL JAW 4 W NDL 2.4MM 2.8MM 240CM ORANGE DISP

## (undated) DEVICE — DRAIN RESERVOIR FOR JACKSON PRATT 100CC CARDINAL

## (undated) DEVICE — SSH-EUS LINEAR 7310003: Type: DURABLE MEDICAL EQUIPMENT

## (undated) DEVICE — COVIDIEN SIGNIA POWER CONTROL SHELL

## (undated) DEVICE — DRSG CURITY GAUZE SPONGE 4 X 4" 12-PLY NON-STERILE

## (undated) DEVICE — XI DRAPE ARM

## (undated) DEVICE — MASK PROCED EARLOOP 50/BX LRC COVID ADD

## (undated) DEVICE — PREP CHLORAPREP HI-LITE ORANGE 26ML

## (undated) DEVICE — DRAPE XL SHEET 77X98"

## (undated) DEVICE — XI VESSEL SEALER

## (undated) DEVICE — APPLICATOR FOR TISSEEL FLEX TIP 360 W SNAP LOCK 40CM

## (undated) DEVICE — SUT SILK 2-0 18" SH (POP-OFF)

## (undated) DEVICE — DRAPE IOBAN 23" X 17"

## (undated) DEVICE — SOL IRR BAG H2O 1000ML

## (undated) DEVICE — XI ARM FORCEP CADIERE 8MM

## (undated) DEVICE — TUBING ALARIS PUMP MODULE NON-DEHP

## (undated) DEVICE — NDL ACQUIRE EUS FNB 22G

## (undated) DEVICE — STAPLER COVIDIEN ENDO GIA XL HANDLE

## (undated) DEVICE — WARMING BLANKET FULL ADULT

## (undated) DEVICE — TROCAR SURGIQUEST AIRSEAL 8MMX100MM

## (undated) DEVICE — SYR SLIP 10CC

## (undated) DEVICE — ELCTR BOVIE PENCIL SMOKE EVACUATION 15FT

## (undated) DEVICE — STERIS DEFENDO 3-PIECE KIT (AIR/WATER, SUCTION & BIOPSY VALVES)

## (undated) DEVICE — DRSG 2X2